# Patient Record
Sex: FEMALE | Race: ASIAN | NOT HISPANIC OR LATINO | Employment: FULL TIME | URBAN - METROPOLITAN AREA
[De-identification: names, ages, dates, MRNs, and addresses within clinical notes are randomized per-mention and may not be internally consistent; named-entity substitution may affect disease eponyms.]

---

## 2017-07-20 ENCOUNTER — OFFICE VISIT (OUTPATIENT)
Dept: INTERNAL MEDICINE | Facility: CLINIC | Age: 22
End: 2017-07-20

## 2017-07-20 VITALS
WEIGHT: 199 LBS | HEART RATE: 72 BPM | HEIGHT: 71 IN | BODY MASS INDEX: 27.86 KG/M2 | SYSTOLIC BLOOD PRESSURE: 120 MMHG | DIASTOLIC BLOOD PRESSURE: 68 MMHG

## 2017-07-20 DIAGNOSIS — M54.9 CHRONIC BILATERAL BACK PAIN, UNSPECIFIED BACK LOCATION: Primary | ICD-10-CM

## 2017-07-20 DIAGNOSIS — G89.29 CHRONIC BILATERAL BACK PAIN, UNSPECIFIED BACK LOCATION: Primary | ICD-10-CM

## 2017-07-20 PROCEDURE — 99213 OFFICE O/P EST LOW 20 MIN: CPT | Performed by: INTERNAL MEDICINE

## 2017-07-20 NOTE — PROGRESS NOTES
Patient is a 21 y.o. female who is here for back pain.  Chief Complaint   Patient presents with   • Back Pain         HPI:  Patient c/o increased back pain.  Thinks it's related to her large breast.  Unable to exercise due to the back pain.  Not tried any prescription rx.  Has also seen chiropractor.  Has not tried PT yet.     History:    Patient Active Problem List   Diagnosis   • Routine general medical examination at a health care facility   • Chronic bilateral back pain       No past medical history on file.    Past Surgical History:   Procedure Laterality Date   • TONSILLECTOMY AND ADENOIDECTOMY         Current Outpatient Prescriptions on File Prior to Visit   Medication Sig   • aluminum chloride (DRYSOL) 20 % external solution Apply  topically Every Night.     No current facility-administered medications on file prior to visit.        Family History   Problem Relation Age of Onset   • Diabetes Other    • Hypertension Other        Social History     Social History   • Marital status: Single     Spouse name: N/A   • Number of children: N/A   • Years of education: N/A     Occupational History   • Not on file.     Social History Main Topics   • Smoking status: Never Smoker   • Smokeless tobacco: Not on file   • Alcohol use Not on file   • Drug use: Not on file   • Sexual activity: Not on file     Other Topics Concern   • Not on file     Social History Narrative         ROS:    Review of Systems   Constitutional: Negative for chills, fatigue, fever and unexpected weight change.   HENT: Negative for congestion, ear pain, hearing loss, rhinorrhea, sinus pressure, sore throat and trouble swallowing.    Eyes: Negative for discharge and itching.   Respiratory: Negative for cough, chest tightness and shortness of breath.    Cardiovascular: Negative for chest pain, palpitations and leg swelling.   Gastrointestinal: Negative for abdominal pain, blood in stool, constipation, diarrhea and vomiting.   Endocrine: Negative for  "polydipsia and polyuria.   Genitourinary: Negative for difficulty urinating, dysuria, enuresis, frequency, hematuria and urgency.        GYN with Dr Dial   Musculoskeletal: Positive for back pain. Negative for arthralgias, gait problem and joint swelling.   Skin: Negative for rash and wound.   Allergic/Immunologic: Negative for immunocompromised state.   Neurological: Negative for dizziness, syncope, weakness, light-headedness, numbness and headaches.   Hematological: Does not bruise/bleed easily.   Psychiatric/Behavioral: Negative for behavioral problems, dysphoric mood and sleep disturbance. The patient is not nervous/anxious.        /68  Pulse 72  Ht 71\" (180.3 cm)  Wt 199 lb (90.3 kg)  BMI 27.75 kg/m2    Physical Exam:    Physical Exam   Constitutional: She is oriented to person, place, and time. She appears well-developed and well-nourished.   HENT:   Head: Normocephalic and atraumatic.   Right Ear: External ear normal.   Left Ear: External ear normal.   Mouth/Throat: Oropharynx is clear and moist.   Eyes: Conjunctivae and EOM are normal. Pupils are equal, round, and reactive to light.   Neck: Normal range of motion. Neck supple.   Cardiovascular: Normal rate, regular rhythm, normal heart sounds and intact distal pulses.    Pulmonary/Chest: Effort normal and breath sounds normal.   Abdominal: Soft. Bowel sounds are normal.   Musculoskeletal: Normal range of motion. She exhibits deformity (mild kyphosis).   Neurological: She is alert and oriented to person, place, and time. She has normal reflexes.   Skin: Skin is warm and dry.   Psychiatric: She has a normal mood and affect. Her behavior is normal. Judgment and thought content normal.   Vitals reviewed.      Procedure:      Discussion/Summary:    Chronic back pain sec to large breast-  Will give trial of vimovo, and refer to plastics      Current Outpatient Prescriptions:   •  aluminum chloride (DRYSOL) 20 % external solution, Apply  topically " Every Night., Disp: 60 mL, Rfl: 2  •  SPRINTEC 28 0.25-35 MG-MCG per tablet, , Disp: , Rfl:   •  Naproxen-Esomeprazole (VIMOVO) 500-20 MG tablet delayed-release, Take 1 tablet by mouth 2 (Two) Times a Day As Needed (back pain)., Disp: 36 tablet, Rfl: 0        Ida was seen today for back pain.    Diagnoses and all orders for this visit:    Chronic bilateral back pain, unspecified back location  -     Naproxen-Esomeprazole (VIMOVO) 500-20 MG tablet delayed-release; Take 1 tablet by mouth 2 (Two) Times a Day As Needed (back pain).

## 2018-07-25 ENCOUNTER — OFFICE VISIT (OUTPATIENT)
Dept: INTERNAL MEDICINE | Facility: CLINIC | Age: 23
End: 2018-07-25

## 2018-07-25 VITALS
DIASTOLIC BLOOD PRESSURE: 80 MMHG | WEIGHT: 198.4 LBS | BODY MASS INDEX: 27.67 KG/M2 | SYSTOLIC BLOOD PRESSURE: 110 MMHG | OXYGEN SATURATION: 100 % | HEART RATE: 84 BPM

## 2018-07-25 DIAGNOSIS — Z00.00 HEALTHCARE MAINTENANCE: Primary | ICD-10-CM

## 2018-07-25 PROCEDURE — 99395 PREV VISIT EST AGE 18-39: CPT | Performed by: INTERNAL MEDICINE

## 2018-07-25 RX ORDER — ETONOGESTREL/ETHINYL ESTRADIOL .12-.015MG
RING, VAGINAL VAGINAL
COMMUNITY
Start: 2018-06-25 | End: 2019-07-29

## 2018-07-25 NOTE — PROGRESS NOTES
Chief Complaint   Patient presents with   • Annual Exam     Is considering breast reduction secondary to the amount of upper back pain it has been causing. Exercise and still trouble losing weight.    Reported Health  Good Yes  FairNo  PoorNo      Dental,Vision,Hearing  Regular dental visitsYes  Vision ProblemsYes  Hearing LossNo      Immunization Status:  Up To DateYes        Lifestyle  Healthy DietYes  Weight ConcernsNo  Regular ExerciseYes  Tobacco UseNo  Alcohol UseNo  Drug AbuseNo      Screening  Cancer ScreeningYes  Metabolic ScreeningYes  Risk ScreeningYes      Review of Systems   Constitutional: Negative for activity change, appetite change, chills, diaphoresis, fatigue, fever and unexpected weight change.   HENT: Negative for congestion, ear discharge, ear pain, mouth sores, nosebleeds, sinus pressure, sneezing and sore throat.    Eyes: Negative for pain, discharge and itching.   Respiratory: Negative for cough, chest tightness, shortness of breath and wheezing.    Cardiovascular: Negative for chest pain, palpitations and leg swelling.   Gastrointestinal: Negative for abdominal pain, constipation, diarrhea, nausea and vomiting.   Endocrine: Negative for cold intolerance, heat intolerance, polydipsia and polyphagia.   Genitourinary: Negative for dysuria, flank pain, frequency, hematuria and urgency.   Musculoskeletal: Positive for back pain (upper mainly). Negative for arthralgias, gait problem, myalgias, neck pain and neck stiffness.   Skin: Negative for color change, pallor and rash.   Neurological: Negative for seizures, speech difficulty, numbness and headaches.   Psychiatric/Behavioral: Negative for agitation, confusion, decreased concentration and sleep disturbance. The patient is not nervous/anxious.      /80   Pulse 84   Wt 90 kg (198 lb 6.4 oz)   SpO2 100%   BMI 27.67 kg/m²     Physical Exam   Constitutional: She is oriented to person, place, and time. She appears well-developed.   HENT:    Head: Normocephalic.   Right Ear: External ear normal.   Left Ear: External ear normal.   Nose: Nose normal.   Mouth/Throat: Oropharynx is clear and moist.   Eyes: Pupils are equal, round, and reactive to light. Conjunctivae are normal.   Neck: No JVD present. No thyromegaly present.   Cardiovascular: Normal rate, regular rhythm and normal heart sounds.  Exam reveals no friction rub.    No murmur heard.  Pulmonary/Chest: Effort normal and breath sounds normal. No respiratory distress. She has no wheezes. She has no rales.   Abdominal: Soft. Bowel sounds are normal. She exhibits no distension. There is no tenderness. There is no guarding.   Musculoskeletal: She exhibits no edema or tenderness.   Lymphadenopathy:     She has no cervical adenopathy.   Neurological: She is oriented to person, place, and time. She displays normal reflexes. No cranial nerve deficit.   Skin: No rash noted.   Psychiatric: Her behavior is normal.   Nursing note and vitals reviewed.                Diet and Exercise    Healthy Diet Yes  Adequate DietYes  Poor DietNo  Adequate Exercise RegimenYes  Inadequate Exercise RegimenNo      Cervical Cancer Screening    Risks and benefits discussedYes  Screening currentYes  PAP Done Today OrderedNo  Screening Not IndicatedNo  Pap Every 3 yearsYes  Screening Done By GYNNo    Breast Cancer screening  Not indicated      STD Testing  ChlamydiaNo  GonorrheaNo  HIVNo      Osteoporosis Screening  Not indicated    Colorectal Cancer Screening  Screen at 50 or sooner if indicated  Metabolic Screening  GlucoseYes  LipidsYes  CBCYes  TSHYes  UAYes  CMPYes  25OHNo      Immunizations  Risks and benefits discussedYes  Immunizations Up To Dateyes  Immunizations NeededNo  Immunizations Per OrdersNo  Patient DNoeclines      Preventative Counseling  NutritionYes  Aerobic ExerciseYes  Weight Bearing ExerciseYes  Weight LossYes  Calcium SupplementsYes  Vitamin D SupplementsYes  Reproductive HealthNo  Cardiovascular Risk  ReductionNo  Tobacco CessationNo  Alcohol UseNo  Sunscreen UseYes  Self Skin ExaminationNo  Helmet UseNo  Seat Belt UseYes  Fall Risk ReductionNo  Advanced Directive PlanningNo      Patient Discussion  PatientYes  FamilyNo  Counselingyes  Ida was seen today for annual exam.    Diagnoses and all orders for this visit:    Healthcare maintenance  -     Comprehensive Metabolic Panel; Future  -     Lipid Panel; Future  -     CBC & Differential; Future  -     TSH; Future

## 2018-07-26 ENCOUNTER — LAB (OUTPATIENT)
Dept: INTERNAL MEDICINE | Facility: CLINIC | Age: 23
End: 2018-07-26

## 2018-07-26 DIAGNOSIS — Z00.00 HEALTHCARE MAINTENANCE: ICD-10-CM

## 2018-07-26 LAB
ALBUMIN SERPL-MCNC: 4.38 G/DL (ref 3.2–4.8)
ALBUMIN/GLOB SERPL: 1.6 G/DL (ref 1.5–2.5)
ALP SERPL-CCNC: 65 U/L (ref 25–100)
ALT SERPL W P-5'-P-CCNC: 21 U/L (ref 7–40)
ANION GAP SERPL CALCULATED.3IONS-SCNC: 9 MMOL/L (ref 3–11)
ARTICHOKE IGE QN: 118 MG/DL (ref 0–130)
AST SERPL-CCNC: 24 U/L (ref 0–33)
BASOPHILS # BLD AUTO: 0.03 10*3/MM3 (ref 0–0.2)
BASOPHILS NFR BLD AUTO: 0.4 % (ref 0–1)
BILIRUB SERPL-MCNC: 0.5 MG/DL (ref 0.3–1.2)
BUN BLD-MCNC: 11 MG/DL (ref 9–23)
BUN/CREAT SERPL: 14.1 (ref 7–25)
CALCIUM SPEC-SCNC: 9.7 MG/DL (ref 8.7–10.4)
CHLORIDE SERPL-SCNC: 109 MMOL/L (ref 99–109)
CHOLEST SERPL-MCNC: 201 MG/DL (ref 0–200)
CO2 SERPL-SCNC: 25 MMOL/L (ref 20–31)
CREAT BLD-MCNC: 0.78 MG/DL (ref 0.6–1.3)
DEPRECATED RDW RBC AUTO: 45.3 FL (ref 37–54)
EOSINOPHIL # BLD AUTO: 0.36 10*3/MM3 (ref 0–0.3)
EOSINOPHIL NFR BLD AUTO: 4.9 % (ref 0–3)
ERYTHROCYTE [DISTWIDTH] IN BLOOD BY AUTOMATED COUNT: 14.3 % (ref 11.3–14.5)
GFR SERPL CREATININE-BSD FRML MDRD: 111 ML/MIN/1.73
GFR SERPL CREATININE-BSD FRML MDRD: 92 ML/MIN/1.73
GLOBULIN UR ELPH-MCNC: 2.8 GM/DL
GLUCOSE BLD-MCNC: 82 MG/DL (ref 70–100)
HCT VFR BLD AUTO: 43.8 % (ref 34.5–44)
HDLC SERPL-MCNC: 81 MG/DL (ref 40–60)
HGB BLD-MCNC: 14.6 G/DL (ref 11.5–15.5)
IMM GRANULOCYTES # BLD: 0.02 10*3/MM3 (ref 0–0.03)
IMM GRANULOCYTES NFR BLD: 0.3 % (ref 0–0.6)
LYMPHOCYTES # BLD AUTO: 3.07 10*3/MM3 (ref 0.6–4.8)
LYMPHOCYTES NFR BLD AUTO: 42.2 % (ref 24–44)
MCH RBC QN AUTO: 29.1 PG (ref 27–31)
MCHC RBC AUTO-ENTMCNC: 33.3 G/DL (ref 32–36)
MCV RBC AUTO: 87.3 FL (ref 80–99)
MONOCYTES # BLD AUTO: 0.48 10*3/MM3 (ref 0–1)
MONOCYTES NFR BLD AUTO: 6.6 % (ref 0–12)
NEUTROPHILS # BLD AUTO: 3.32 10*3/MM3 (ref 1.5–8.3)
NEUTROPHILS NFR BLD AUTO: 45.6 % (ref 41–71)
PLATELET # BLD AUTO: 231 10*3/MM3 (ref 150–450)
PMV BLD AUTO: 11.1 FL (ref 6–12)
POTASSIUM BLD-SCNC: 5.9 MMOL/L (ref 3.5–5.5)
PROT SERPL-MCNC: 7.2 G/DL (ref 5.7–8.2)
RBC # BLD AUTO: 5.02 10*6/MM3 (ref 3.89–5.14)
SODIUM BLD-SCNC: 143 MMOL/L (ref 132–146)
TRIGL SERPL-MCNC: 137 MG/DL (ref 0–150)
TSH SERPL DL<=0.05 MIU/L-ACNC: 2.25 MIU/ML (ref 0.35–5.35)
WBC NRBC COR # BLD: 7.28 10*3/MM3 (ref 3.5–10.8)

## 2018-07-26 PROCEDURE — 80053 COMPREHEN METABOLIC PANEL: CPT | Performed by: INTERNAL MEDICINE

## 2018-07-26 PROCEDURE — 80061 LIPID PANEL: CPT | Performed by: INTERNAL MEDICINE

## 2018-07-26 PROCEDURE — 84443 ASSAY THYROID STIM HORMONE: CPT | Performed by: INTERNAL MEDICINE

## 2018-07-26 PROCEDURE — 85025 COMPLETE CBC W/AUTO DIFF WBC: CPT | Performed by: INTERNAL MEDICINE

## 2018-08-01 ENCOUNTER — TELEPHONE (OUTPATIENT)
Dept: INTERNAL MEDICINE | Facility: CLINIC | Age: 23
End: 2018-08-01

## 2018-08-01 DIAGNOSIS — E87.5 HYPERKALEMIA: Primary | ICD-10-CM

## 2018-08-02 ENCOUNTER — OFFICE VISIT (OUTPATIENT)
Dept: INTERNAL MEDICINE | Facility: CLINIC | Age: 23
End: 2018-08-02

## 2018-08-02 VITALS
HEART RATE: 88 BPM | WEIGHT: 195.4 LBS | SYSTOLIC BLOOD PRESSURE: 100 MMHG | DIASTOLIC BLOOD PRESSURE: 80 MMHG | OXYGEN SATURATION: 98 % | BODY MASS INDEX: 27.25 KG/M2

## 2018-08-02 DIAGNOSIS — E87.5 HYPERKALEMIA: ICD-10-CM

## 2018-08-02 DIAGNOSIS — Z87.898 HISTORY OF BREAST LUMP: Primary | ICD-10-CM

## 2018-08-02 DIAGNOSIS — N64.4 BREAST PAIN, RIGHT: ICD-10-CM

## 2018-08-02 LAB
ANION GAP SERPL CALCULATED.3IONS-SCNC: 11 MMOL/L (ref 3–11)
BUN BLD-MCNC: 14 MG/DL (ref 9–23)
BUN/CREAT SERPL: 16.7 (ref 7–25)
CALCIUM SPEC-SCNC: 9.6 MG/DL (ref 8.7–10.4)
CHLORIDE SERPL-SCNC: 106 MMOL/L (ref 99–109)
CO2 SERPL-SCNC: 24 MMOL/L (ref 20–31)
CREAT BLD-MCNC: 0.84 MG/DL (ref 0.6–1.3)
GFR SERPL CREATININE-BSD FRML MDRD: 102 ML/MIN/1.73
GFR SERPL CREATININE-BSD FRML MDRD: 84 ML/MIN/1.73
GLUCOSE BLD-MCNC: 85 MG/DL (ref 70–100)
POTASSIUM BLD-SCNC: 4.4 MMOL/L (ref 3.5–5.5)
SODIUM BLD-SCNC: 141 MMOL/L (ref 132–146)

## 2018-08-02 PROCEDURE — 99213 OFFICE O/P EST LOW 20 MIN: CPT | Performed by: INTERNAL MEDICINE

## 2018-08-02 PROCEDURE — 80048 BASIC METABOLIC PNL TOTAL CA: CPT | Performed by: INTERNAL MEDICINE

## 2018-08-02 NOTE — PROGRESS NOTES
Subjective   Ida Vasquez is a 22 y.o. female.   Chief Complaint   Patient presents with   • Breast Mass     acute       History of Present Illness   Right breast lump that was found one and half months ago. Per pt gyn found it .  She was told by gyn to go to  for evaluation for this but did not go .   The following portions of the patient's history were reviewed and updated as appropriate: allergies, current medications, past family history, past medical history, past social history, past surgical history and problem list.  pnh    Review of Systems   Constitutional: Negative for activity change, appetite change, chills, diaphoresis, fatigue, fever and unexpected weight change.   HENT: Negative for congestion, ear discharge, ear pain, mouth sores, nosebleeds, sinus pressure, sneezing and sore throat.    Eyes: Negative for pain, discharge and itching.   Respiratory: Negative for cough, chest tightness, shortness of breath and wheezing.    Cardiovascular: Negative for chest pain, palpitations and leg swelling.   Gastrointestinal: Negative for abdominal pain, constipation, diarrhea, nausea and vomiting.   Endocrine: Negative for cold intolerance, heat intolerance, polydipsia and polyphagia.   Genitourinary: Negative for dysuria, flank pain, frequency, hematuria and urgency.   Musculoskeletal: Negative for arthralgias, back pain, gait problem, myalgias, neck pain and neck stiffness.   Skin: Negative for color change, pallor and rash.   Neurological: Negative for seizures, speech difficulty, numbness and headaches.   Psychiatric/Behavioral: Negative for agitation, confusion, decreased concentration and sleep disturbance. The patient is not nervous/anxious.      /80   Pulse 88   Wt 88.6 kg (195 lb 6.4 oz)   SpO2 98%   BMI 27.25 kg/m²     Objective   Physical Exam   Constitutional: She appears well-developed.   HENT:   Head: Normocephalic.   Right Ear: External ear normal.   Left Ear: External ear normal.    Nose: Nose normal.   Mouth/Throat: Oropharynx is clear and moist.   Eyes: Pupils are equal, round, and reactive to light. Conjunctivae are normal.   Neck: No JVD present. No thyromegaly present.   Cardiovascular: Normal rate, regular rhythm and normal heart sounds.  Exam reveals no friction rub.    No murmur heard.  Pulmonary/Chest: Effort normal and breath sounds normal. No respiratory distress. She has no wheezes. She has no rales.   Abdominal: Soft. Bowel sounds are normal. She exhibits no distension. There is no tenderness. There is no guarding.   Genitourinary:   Genitourinary Comments: Right breast no mass. Left mass. No LN.    Musculoskeletal: She exhibits no edema or tenderness.   Lymphadenopathy:     She has no cervical adenopathy.   Neurological: She displays normal reflexes. No cranial nerve deficit.   Skin: No rash noted.   Psychiatric: Her behavior is normal.   Nursing note and vitals reviewed.      Assessment/Plan   Ida was seen today for breast mass.    Diagnoses and all orders for this visit:    History of breast lump  -     Mammo diagnostic digital tomosynthesis right w CAD; Future    Hyperkalemia  -     Basic metabolic panel    Breast pain, right  -     Mammo diagnostic digital tomosynthesis right w CAD; Future

## 2018-08-03 DIAGNOSIS — N64.4 BREAST PAIN, RIGHT: Primary | ICD-10-CM

## 2018-08-03 DIAGNOSIS — Z87.898 HISTORY OF BREAST LUMP: ICD-10-CM

## 2019-07-29 ENCOUNTER — OFFICE VISIT (OUTPATIENT)
Dept: INTERNAL MEDICINE | Facility: CLINIC | Age: 24
End: 2019-07-29

## 2019-07-29 VITALS
HEART RATE: 91 BPM | OXYGEN SATURATION: 98 % | WEIGHT: 210.4 LBS | SYSTOLIC BLOOD PRESSURE: 100 MMHG | BODY MASS INDEX: 29.46 KG/M2 | DIASTOLIC BLOOD PRESSURE: 70 MMHG | HEIGHT: 71 IN

## 2019-07-29 DIAGNOSIS — Z00.00 HEALTHCARE MAINTENANCE: Primary | ICD-10-CM

## 2019-07-29 LAB
ALBUMIN SERPL-MCNC: 4.4 G/DL (ref 3.5–5.2)
ALBUMIN/GLOB SERPL: 1.3 G/DL
ALP SERPL-CCNC: 64 U/L (ref 39–117)
ALT SERPL W P-5'-P-CCNC: 24 U/L (ref 1–33)
ANION GAP SERPL CALCULATED.3IONS-SCNC: 10.6 MMOL/L (ref 5–15)
AST SERPL-CCNC: 28 U/L (ref 1–32)
BASOPHILS # BLD AUTO: 0.06 10*3/MM3 (ref 0–0.2)
BASOPHILS NFR BLD AUTO: 0.8 % (ref 0–1.5)
BILIRUB BLD-MCNC: ABNORMAL MG/DL
BILIRUB SERPL-MCNC: 0.4 MG/DL (ref 0.2–1.2)
BUN BLD-MCNC: 12 MG/DL (ref 6–20)
BUN/CREAT SERPL: 13.3 (ref 7–25)
CALCIUM SPEC-SCNC: 9.9 MG/DL (ref 8.6–10.5)
CHLORIDE SERPL-SCNC: 105 MMOL/L (ref 98–107)
CHOLEST SERPL-MCNC: 202 MG/DL (ref 0–200)
CLARITY, POC: CLEAR
CO2 SERPL-SCNC: 25.4 MMOL/L (ref 22–29)
COLOR UR: YELLOW
CREAT BLD-MCNC: 0.9 MG/DL (ref 0.57–1)
DEPRECATED RDW RBC AUTO: 48.8 FL (ref 37–54)
EOSINOPHIL # BLD AUTO: 0.37 10*3/MM3 (ref 0–0.4)
EOSINOPHIL NFR BLD AUTO: 4.8 % (ref 0.3–6.2)
ERYTHROCYTE [DISTWIDTH] IN BLOOD BY AUTOMATED COUNT: 14.7 % (ref 12.3–15.4)
GFR SERPL CREATININE-BSD FRML MDRD: 77 ML/MIN/1.73
GFR SERPL CREATININE-BSD FRML MDRD: 93 ML/MIN/1.73
GLOBULIN UR ELPH-MCNC: 3.3 GM/DL
GLUCOSE BLD-MCNC: 97 MG/DL (ref 65–99)
GLUCOSE UR STRIP-MCNC: NEGATIVE MG/DL
HCT VFR BLD AUTO: 44.2 % (ref 34–46.6)
HDLC SERPL-MCNC: 76 MG/DL (ref 40–60)
HGB BLD-MCNC: 13.9 G/DL (ref 12–15.9)
IMM GRANULOCYTES # BLD AUTO: 0.02 10*3/MM3 (ref 0–0.05)
IMM GRANULOCYTES NFR BLD AUTO: 0.3 % (ref 0–0.5)
KETONES UR QL: ABNORMAL
LDLC SERPL CALC-MCNC: 113 MG/DL (ref 0–100)
LDLC/HDLC SERPL: 1.48 {RATIO}
LEUKOCYTE EST, POC: NEGATIVE
LYMPHOCYTES # BLD AUTO: 2.08 10*3/MM3 (ref 0.7–3.1)
LYMPHOCYTES NFR BLD AUTO: 27.2 % (ref 19.6–45.3)
MCH RBC QN AUTO: 28.3 PG (ref 26.6–33)
MCHC RBC AUTO-ENTMCNC: 31.4 G/DL (ref 31.5–35.7)
MCV RBC AUTO: 89.8 FL (ref 79–97)
MONOCYTES # BLD AUTO: 0.61 10*3/MM3 (ref 0.1–0.9)
MONOCYTES NFR BLD AUTO: 8 % (ref 5–12)
NEUTROPHILS # BLD AUTO: 4.52 10*3/MM3 (ref 1.7–7)
NEUTROPHILS NFR BLD AUTO: 58.9 % (ref 42.7–76)
NITRITE UR-MCNC: NEGATIVE MG/ML
NRBC BLD AUTO-RTO: 0 /100 WBC (ref 0–0.2)
PH UR: 5 [PH] (ref 5–8)
PLATELET # BLD AUTO: 261 10*3/MM3 (ref 140–450)
PMV BLD AUTO: 12 FL (ref 6–12)
POTASSIUM BLD-SCNC: 4.4 MMOL/L (ref 3.5–5.2)
PROT SERPL-MCNC: 7.7 G/DL (ref 6–8.5)
PROT UR STRIP-MCNC: NEGATIVE MG/DL
RBC # BLD AUTO: 4.92 10*6/MM3 (ref 3.77–5.28)
RBC # UR STRIP: NEGATIVE /UL
SODIUM BLD-SCNC: 141 MMOL/L (ref 136–145)
SP GR UR: 1.02 (ref 1–1.03)
TRIGL SERPL-MCNC: 66 MG/DL (ref 0–150)
TSH SERPL DL<=0.05 MIU/L-ACNC: 1.61 MIU/ML (ref 0.27–4.2)
UROBILINOGEN UR QL: ABNORMAL
VLDLC SERPL-MCNC: 13.2 MG/DL (ref 5–40)
WBC NRBC COR # BLD: 7.66 10*3/MM3 (ref 3.4–10.8)

## 2019-07-29 PROCEDURE — 99395 PREV VISIT EST AGE 18-39: CPT | Performed by: INTERNAL MEDICINE

## 2019-07-29 PROCEDURE — 81003 URINALYSIS AUTO W/O SCOPE: CPT | Performed by: INTERNAL MEDICINE

## 2019-07-29 PROCEDURE — 80053 COMPREHEN METABOLIC PANEL: CPT | Performed by: INTERNAL MEDICINE

## 2019-07-29 PROCEDURE — 80061 LIPID PANEL: CPT | Performed by: INTERNAL MEDICINE

## 2019-07-29 PROCEDURE — 84443 ASSAY THYROID STIM HORMONE: CPT | Performed by: INTERNAL MEDICINE

## 2019-07-29 PROCEDURE — 85025 COMPLETE CBC W/AUTO DIFF WBC: CPT | Performed by: INTERNAL MEDICINE

## 2019-07-29 NOTE — PROGRESS NOTES
Chief Complaint   Patient presents with   • Annual Exam       Reported Health  Good Yes  FairNo  PoorNo      Dental,Vision,Hearing  Regular dental visitsYes  Vision ProblemsNo  Hearing LossNo      Immunization Status:  Up To DateNo        Lifestyle  Healthy DietYes  Weight ConcernsNo  Regular ExerciseYes  Tobacco UseNo  Alcohol UseYes  Drug AbuseNo      Screening  Cancer ScreeningYes  Metabolic ScreeningYes  Risk ScreeningYes  Past Medical History:   Diagnosis Date   • Allergic    • EBV infection      Past Surgical History:   Procedure Laterality Date   • TONSILLECTOMY     • TONSILLECTOMY AND ADENOIDECTOMY       Family History   Problem Relation Age of Onset   • Diabetes Other    • Hypertension Other    • Heart disease Father    • Diabetes Father    • Leukemia Father        Social History     Socioeconomic History   • Marital status: Single     Spouse name: Not on file   • Number of children: Not on file   • Years of education: Not on file   • Highest education level: Not on file   Tobacco Use   • Smoking status: Never Smoker   • Smokeless tobacco: Never Used   Substance and Sexual Activity   • Alcohol use: No   • Drug use: No       Review of Systems   Constitutional: Negative for activity change, appetite change, chills, diaphoresis, fatigue, fever and unexpected weight change.   HENT: Negative for congestion, ear discharge, ear pain, mouth sores, nosebleeds, sinus pressure, sneezing and sore throat.    Eyes: Negative for pain, discharge and itching.   Respiratory: Negative for cough, chest tightness, shortness of breath and wheezing.    Cardiovascular: Negative for chest pain, palpitations and leg swelling.   Gastrointestinal: Negative for abdominal pain, constipation, diarrhea, nausea and vomiting.   Endocrine: Negative for cold intolerance, heat intolerance, polydipsia and polyphagia.   Genitourinary: Negative for dysuria, flank pain, frequency, hematuria and urgency.   Musculoskeletal: Negative for  "arthralgias, back pain, gait problem, myalgias, neck pain and neck stiffness.   Skin: Negative for color change, pallor and rash.   Neurological: Negative for seizures, speech difficulty, numbness and headaches.   Psychiatric/Behavioral: Negative for agitation, confusion, decreased concentration and sleep disturbance. The patient is not nervous/anxious.    /70   Pulse 91   Ht 179.1 cm (70.5\")   Wt 95.4 kg (210 lb 6.4 oz)   SpO2 98%   BMI 29.76 kg/m²       Physical Exam   Constitutional: She is oriented to person, place, and time. She appears well-developed.   HENT:   Head: Normocephalic.   Right Ear: External ear normal.   Left Ear: External ear normal.   Nose: Nose normal.   Mouth/Throat: Oropharynx is clear and moist.   Eyes: Conjunctivae are normal. Pupils are equal, round, and reactive to light.   Neck: No JVD present. No thyromegaly present.   Cardiovascular: Normal rate, regular rhythm and normal heart sounds. Exam reveals no friction rub.   No murmur heard.  Pulmonary/Chest: Effort normal and breath sounds normal. No respiratory distress. She has no wheezes. She has no rales.   Abdominal: Soft. Bowel sounds are normal. She exhibits no distension. There is no tenderness. There is no guarding.   Musculoskeletal: She exhibits no edema or tenderness.   Lymphadenopathy:     She has no cervical adenopathy.   Neurological: She is alert and oriented to person, place, and time. She displays normal reflexes. No cranial nerve deficit.   Skin: No rash noted.   Psychiatric: Her behavior is normal.   Nursing note and vitals reviewed.                Diet and Exercise    Healthy Diet Yes  Adequate DietYes  Poor DietNo  Adequate Exercise RegimenYes  Inadequate Exercise RegimenNo      Cervical Cancer Screening    Risks and benefits discussedYes  Screening currentYes  PAP Done Today OrderedNo  Screening Not IndicatedNo  Pap Every 3 yearsYes  Screening Done By GYNNo    Breast Cancer screening  Not indicated      STD " Testing  ChlamydiaNo  GonorrheaNo  HIVNo      Osteoporosis Screening  Not indicated    Colorectal Cancer Screening  Not indicated    Metabolic Screening  GlucoseYes  LipidsYes  CBCYes  TSHYes  UAYes  CMPYes  25OHNo      Immunizations  Risks and benefits discussedYes  Immunizations Up To DateYes  Immunizations NeededNo  Immunizations Per OrdersNo  Patient DNoeclines      Preventative Counseling  NutritionYes  Aerobic ExerciseYes  Weight Bearing ExerciseYes  Weight LossNo  Calcium SupplementsYes  Vitamin D SupplementsYes  Reproductive HealthYes  Cardiovascular Risk ReductionNo  Tobacco CessationNo  Alcohol UseYes  Sunscreen UseYes  Self Skin ExaminationYes  Helmet UseYes  Seat Belt UseYes  Fall Risk ReductionNo  Advanced Directive PlanningNo      Patient Discussion  PatientYes  FamilyNo  CounselingYes  Ida was seen today for annual exam.    Diagnoses and all orders for this visit:    Healthcare maintenance  -     CBC & Differential  -     Comprehensive Metabolic Panel  -     Lipid Panel  -     TSH

## 2020-07-31 ENCOUNTER — OFFICE VISIT (OUTPATIENT)
Dept: INTERNAL MEDICINE | Facility: CLINIC | Age: 25
End: 2020-07-31

## 2020-07-31 ENCOUNTER — LAB (OUTPATIENT)
Dept: LAB | Facility: HOSPITAL | Age: 25
End: 2020-07-31

## 2020-07-31 VITALS
TEMPERATURE: 97.4 F | OXYGEN SATURATION: 99 % | SYSTOLIC BLOOD PRESSURE: 106 MMHG | WEIGHT: 211.6 LBS | HEIGHT: 71 IN | BODY MASS INDEX: 29.62 KG/M2 | HEART RATE: 77 BPM | DIASTOLIC BLOOD PRESSURE: 76 MMHG

## 2020-07-31 DIAGNOSIS — R11.10 VOMITING IN ADULT: ICD-10-CM

## 2020-07-31 DIAGNOSIS — Z23 NEED FOR VACCINATION: ICD-10-CM

## 2020-07-31 DIAGNOSIS — Z00.00 HEALTHCARE MAINTENANCE: ICD-10-CM

## 2020-07-31 DIAGNOSIS — Z00.00 HEALTHCARE MAINTENANCE: Primary | ICD-10-CM

## 2020-07-31 LAB
ALBUMIN SERPL-MCNC: 4.4 G/DL (ref 3.5–5.2)
ALBUMIN/GLOB SERPL: 1.6 G/DL
ALP SERPL-CCNC: 63 U/L (ref 39–117)
ALT SERPL W P-5'-P-CCNC: 19 U/L (ref 1–33)
ANION GAP SERPL CALCULATED.3IONS-SCNC: 11 MMOL/L (ref 5–15)
AST SERPL-CCNC: 24 U/L (ref 1–32)
BASOPHILS # BLD AUTO: 0.05 10*3/MM3 (ref 0–0.2)
BASOPHILS NFR BLD AUTO: 0.7 % (ref 0–1.5)
BILIRUB BLD-MCNC: NEGATIVE MG/DL
BILIRUB SERPL-MCNC: 0.3 MG/DL (ref 0–1.2)
BUN SERPL-MCNC: 8 MG/DL (ref 6–20)
BUN/CREAT SERPL: 9.8 (ref 7–25)
CALCIUM SPEC-SCNC: 9.9 MG/DL (ref 8.6–10.5)
CHLORIDE SERPL-SCNC: 104 MMOL/L (ref 98–107)
CHOLEST SERPL-MCNC: 181 MG/DL (ref 0–200)
CLARITY, POC: CLEAR
CO2 SERPL-SCNC: 23 MMOL/L (ref 22–29)
COLOR UR: YELLOW
CREAT SERPL-MCNC: 0.82 MG/DL (ref 0.57–1)
DEPRECATED RDW RBC AUTO: 43.7 FL (ref 37–54)
EOSINOPHIL # BLD AUTO: 0.1 10*3/MM3 (ref 0–0.4)
EOSINOPHIL NFR BLD AUTO: 1.5 % (ref 0.3–6.2)
ERYTHROCYTE [DISTWIDTH] IN BLOOD BY AUTOMATED COUNT: 13.6 % (ref 12.3–15.4)
GFR SERPL CREATININE-BSD FRML MDRD: 103 ML/MIN/1.73
GFR SERPL CREATININE-BSD FRML MDRD: 85 ML/MIN/1.73
GLOBULIN UR ELPH-MCNC: 2.8 GM/DL
GLUCOSE SERPL-MCNC: 80 MG/DL (ref 65–99)
GLUCOSE UR STRIP-MCNC: NEGATIVE MG/DL
HCG INTACT+B SERPL-ACNC: 0.59 MIU/ML
HCT VFR BLD AUTO: 43.1 % (ref 34–46.6)
HDLC SERPL-MCNC: 66 MG/DL (ref 40–60)
HGB BLD-MCNC: 14.1 G/DL (ref 12–15.9)
IMM GRANULOCYTES # BLD AUTO: 0.01 10*3/MM3 (ref 0–0.05)
IMM GRANULOCYTES NFR BLD AUTO: 0.1 % (ref 0–0.5)
KETONES UR QL: NEGATIVE
LDLC SERPL CALC-MCNC: 96 MG/DL (ref 0–100)
LDLC/HDLC SERPL: 1.45 {RATIO}
LEUKOCYTE EST, POC: NEGATIVE
LYMPHOCYTES # BLD AUTO: 2.42 10*3/MM3 (ref 0.7–3.1)
LYMPHOCYTES NFR BLD AUTO: 35.4 % (ref 19.6–45.3)
MCH RBC QN AUTO: 28.6 PG (ref 26.6–33)
MCHC RBC AUTO-ENTMCNC: 32.7 G/DL (ref 31.5–35.7)
MCV RBC AUTO: 87.4 FL (ref 79–97)
MONOCYTES # BLD AUTO: 0.33 10*3/MM3 (ref 0.1–0.9)
MONOCYTES NFR BLD AUTO: 4.8 % (ref 5–12)
NEUTROPHILS NFR BLD AUTO: 3.92 10*3/MM3 (ref 1.7–7)
NEUTROPHILS NFR BLD AUTO: 57.5 % (ref 42.7–76)
NITRITE UR-MCNC: NEGATIVE MG/ML
NRBC BLD AUTO-RTO: 0 /100 WBC (ref 0–0.2)
PH UR: 5.5 [PH] (ref 5–8)
PLATELET # BLD AUTO: 280 10*3/MM3 (ref 140–450)
PMV BLD AUTO: 11.8 FL (ref 6–12)
POTASSIUM SERPL-SCNC: 4.2 MMOL/L (ref 3.5–5.2)
PROT SERPL-MCNC: 7.2 G/DL (ref 6–8.5)
PROT UR STRIP-MCNC: NEGATIVE MG/DL
RBC # BLD AUTO: 4.93 10*6/MM3 (ref 3.77–5.28)
RBC # UR STRIP: NEGATIVE /UL
SODIUM SERPL-SCNC: 138 MMOL/L (ref 136–145)
SP GR UR: 1.03 (ref 1–1.03)
TRIGL SERPL-MCNC: 95 MG/DL (ref 0–150)
TSH SERPL DL<=0.05 MIU/L-ACNC: 3.06 UIU/ML (ref 0.27–4.2)
UROBILINOGEN UR QL: NORMAL
VLDLC SERPL-MCNC: 19 MG/DL (ref 5–40)
WBC # BLD AUTO: 6.83 10*3/MM3 (ref 3.4–10.8)

## 2020-07-31 PROCEDURE — 84702 CHORIONIC GONADOTROPIN TEST: CPT | Performed by: INTERNAL MEDICINE

## 2020-07-31 PROCEDURE — 90715 TDAP VACCINE 7 YRS/> IM: CPT | Performed by: INTERNAL MEDICINE

## 2020-07-31 PROCEDURE — 99395 PREV VISIT EST AGE 18-39: CPT | Performed by: INTERNAL MEDICINE

## 2020-07-31 PROCEDURE — 84443 ASSAY THYROID STIM HORMONE: CPT | Performed by: INTERNAL MEDICINE

## 2020-07-31 PROCEDURE — 80053 COMPREHEN METABOLIC PANEL: CPT | Performed by: INTERNAL MEDICINE

## 2020-07-31 PROCEDURE — 80061 LIPID PANEL: CPT | Performed by: INTERNAL MEDICINE

## 2020-07-31 PROCEDURE — 85025 COMPLETE CBC W/AUTO DIFF WBC: CPT | Performed by: INTERNAL MEDICINE

## 2020-07-31 PROCEDURE — 81003 URINALYSIS AUTO W/O SCOPE: CPT | Performed by: INTERNAL MEDICINE

## 2020-07-31 PROCEDURE — 90471 IMMUNIZATION ADMIN: CPT | Performed by: INTERNAL MEDICINE

## 2020-07-31 NOTE — PROGRESS NOTES
Chief Complaint   Patient presents with   • Annual Exam   • Vomiting     On and off for a year       Reported Health  Good Yes  FairNo  PoorNo      Dental,Vision,Hearing  Regular dental visitsYes  Vision ProblemsNo  Hearing LossNo      Immunization Status:  Up To DateYes        Lifestyle  Healthy DietYes  Weight ConcernsNo  Regular ExerciseYes  Tobacco UseNo  Alcohol UseYes  Drug AbuseNo      Screening  Cancer ScreeningYes  Metabolic ScreeningYes  Risk ScreeningYes  Past Medical History:   Diagnosis Date   • Allergic    • EBV infection      Past Surgical History:   Procedure Laterality Date   • TONSILLECTOMY     • TONSILLECTOMY AND ADENOIDECTOMY       Family History   Problem Relation Age of Onset   • Diabetes Other    • Hypertension Other    • Heart disease Father    • Diabetes Father    • Leukemia Father      Social History     Socioeconomic History   • Marital status: Single     Spouse name: Not on file   • Number of children: Not on file   • Years of education: Not on file   • Highest education level: Not on file   Tobacco Use   • Smoking status: Never Smoker   • Smokeless tobacco: Never Used   Substance and Sexual Activity   • Alcohol use: No   • Drug use: No         Review of Systems   Constitutional: Negative for activity change, appetite change, chills, diaphoresis, fatigue, fever and unexpected weight change.   HENT: Negative for congestion, ear discharge, ear pain, mouth sores, nosebleeds, sinus pressure, sneezing and sore throat.    Eyes: Negative for pain, discharge and itching.   Respiratory: Negative for cough, chest tightness, shortness of breath and wheezing.    Cardiovascular: Negative for chest pain, palpitations and leg swelling.   Gastrointestinal: Positive for vomiting. Negative for abdominal pain, constipation, diarrhea and nausea. Rectal pain: few random episodes of vomiting over last 2 months. No abdominal pain. No fever.    Endocrine: Negative for cold intolerance, heat intolerance,  "polydipsia and polyphagia.   Genitourinary: Negative for dysuria, flank pain, frequency, hematuria and urgency.   Musculoskeletal: Negative for arthralgias, back pain, gait problem, myalgias, neck pain and neck stiffness.   Skin: Negative for color change, pallor and rash.   Neurological: Negative for seizures, speech difficulty, numbness and headaches.   Psychiatric/Behavioral: Negative for agitation, confusion, decreased concentration and sleep disturbance. The patient is not nervous/anxious.      Blood pressure 106/76, pulse 77, temperature 97.4 °F (36.3 °C), height 179.1 cm (70.5\"), weight 96 kg (211 lb 9.6 oz), last menstrual period 07/22/2020, SpO2 99 %.      Physical Exam   Constitutional: She is oriented to person, place, and time. She appears well-developed.   HENT:   Head: Normocephalic.   Right Ear: External ear normal.   Left Ear: External ear normal.   Nose: Nose normal.   Mouth/Throat: Oropharynx is clear and moist.   Eyes: Pupils are equal, round, and reactive to light. Conjunctivae are normal.   Neck: No JVD present. No thyromegaly present.   Cardiovascular: Normal rate, regular rhythm and normal heart sounds. Exam reveals no friction rub.   No murmur heard.  Pulmonary/Chest: Effort normal and breath sounds normal. No respiratory distress. She has no wheezes. She has no rales.   Abdominal: Soft. Bowel sounds are normal. She exhibits no distension. There is no tenderness. There is no guarding.   Musculoskeletal: She exhibits no edema or tenderness.   Lymphadenopathy:     She has no cervical adenopathy.   Neurological: She is alert and oriented to person, place, and time. She displays normal reflexes. No cranial nerve deficit.   Skin: No rash noted.   Psychiatric: Her behavior is normal.   Nursing note and vitals reviewed.                Diet and Exercise    Healthy Diet Yes  Adequate DietYes  Poor DietNo  Adequate Exercise RegimenYes  Inadequate Exercise RegimenNo      Cervical Cancer " Screening    Risks and benefits discussedYes  Screening currentYes  PAP Done Today OrderedNo  Screening Not IndicatedNo  Pap Every 3 yearsYes  Screening Done By GYNYes    Breast Cancer screening  Not indicated      STD Testing  ChlamydiaNo  GonorrheaNo  HIVNo      Osteoporosis Screening  Not indicated    Colorectal Cancer Screening  Not indicated    Metabolic Screening  GlucoseYes  LipidsYes  CBCYes  TSHYes  UAYes  CMPYes  25OHNo      Immunizations  Risks and benefits discussedYes  Immunizations Up To DateYes  Immunizations NeededNo  Immunizations Per OrdersYes  Patient DNoeclines      Preventative Counseling  NutritionYes  Aerobic ExerciseYes  Weight Bearing ExerciseNo  Weight LossNo  Calcium SupplementsYes  Vitamin D SupplementsYes  Reproductive HealthYes  Cardiovascular Risk ReductionNo  Tobacco CessationNo  Alcohol UseYes  Sunscreen UseYes  Self Skin ExaminationYes  Helmet UseNo  Seat Belt UseYes  Fall Risk ReductionNo  Advanced Directive PlanningNo      Patient Discussion  PatientYes  FamilyNo  CounselingYes  Ida was seen today for annual exam and vomiting.    Diagnoses and all orders for this visit:    Healthcare maintenance  -     CBC & Differential; Future  -     Comprehensive Metabolic Panel; Future  -     Lipid Panel; Future  -     TSH; Future  -     POCT urinalysis dipstick, automated    Need for vaccination  -     Tdap Vaccine Greater Than or Equal To 8yo IM    Vomiting in adult  -     hCG, Quantitative, Pregnancy; Future

## 2021-07-25 ENCOUNTER — HOSPITAL ENCOUNTER (INPATIENT)
Age: 26
LOS: 1 days | Discharge: HOME | DRG: 505 | End: 2021-07-27
Attending: EMERGENCY MEDICINE | Admitting: INTERNAL MEDICINE
Payer: COMMERCIAL

## 2021-07-25 ENCOUNTER — APPOINTMENT (OUTPATIENT)
Dept: GENERAL RADIOLOGY | Age: 26
DRG: 505 | End: 2021-07-25
Payer: COMMERCIAL

## 2021-07-25 DIAGNOSIS — G89.18 POST-OP PAIN: ICD-10-CM

## 2021-07-25 DIAGNOSIS — S92.901A CLOSED FRACTURE OF RIGHT FOOT, INITIAL ENCOUNTER: Primary | ICD-10-CM

## 2021-07-25 DIAGNOSIS — R55 SYNCOPE AND COLLAPSE: ICD-10-CM

## 2021-07-25 LAB
ANION GAP SERPL CALCULATED.3IONS-SCNC: 10 MMOL/L (ref 3–16)
BASOPHILS ABSOLUTE: 0 K/UL (ref 0–0.2)
BASOPHILS RELATIVE PERCENT: 0.2 %
BUN BLDV-MCNC: 12 MG/DL (ref 7–20)
CALCIUM SERPL-MCNC: 9.6 MG/DL (ref 8.3–10.6)
CHLORIDE BLD-SCNC: 104 MMOL/L (ref 99–110)
CO2: 26 MMOL/L (ref 21–32)
CREAT SERPL-MCNC: 0.9 MG/DL (ref 0.6–1.1)
EOSINOPHILS ABSOLUTE: 0.1 K/UL (ref 0–0.6)
EOSINOPHILS RELATIVE PERCENT: 0.8 %
GFR AFRICAN AMERICAN: >60
GFR NON-AFRICAN AMERICAN: >60
GLUCOSE BLD-MCNC: 96 MG/DL (ref 70–99)
HCT VFR BLD CALC: 40.3 % (ref 36–48)
HEMOGLOBIN: 13.9 G/DL (ref 12–16)
LYMPHOCYTES ABSOLUTE: 1.7 K/UL (ref 1–5.1)
LYMPHOCYTES RELATIVE PERCENT: 12.5 %
MCH RBC QN AUTO: 29 PG (ref 26–34)
MCHC RBC AUTO-ENTMCNC: 34.4 G/DL (ref 31–36)
MCV RBC AUTO: 84.3 FL (ref 80–100)
MONOCYTES ABSOLUTE: 0.9 K/UL (ref 0–1.3)
MONOCYTES RELATIVE PERCENT: 6.4 %
NEUTROPHILS ABSOLUTE: 10.9 K/UL (ref 1.7–7.7)
NEUTROPHILS RELATIVE PERCENT: 80.1 %
PDW BLD-RTO: 14.4 % (ref 12.4–15.4)
PLATELET # BLD: 284 K/UL (ref 135–450)
PMV BLD AUTO: 8.6 FL (ref 5–10.5)
POTASSIUM REFLEX MAGNESIUM: 3.8 MMOL/L (ref 3.5–5.1)
RBC # BLD: 4.77 M/UL (ref 4–5.2)
SODIUM BLD-SCNC: 140 MMOL/L (ref 136–145)
WBC # BLD: 13.5 K/UL (ref 4–11)

## 2021-07-25 PROCEDURE — 84703 CHORIONIC GONADOTROPIN ASSAY: CPT

## 2021-07-25 PROCEDURE — 36415 COLL VENOUS BLD VENIPUNCTURE: CPT

## 2021-07-25 PROCEDURE — 80048 BASIC METABOLIC PNL TOTAL CA: CPT

## 2021-07-25 PROCEDURE — 85025 COMPLETE CBC W/AUTO DIFF WBC: CPT

## 2021-07-25 PROCEDURE — 85610 PROTHROMBIN TIME: CPT

## 2021-07-25 PROCEDURE — 73630 X-RAY EXAM OF FOOT: CPT

## 2021-07-25 PROCEDURE — 93005 ELECTROCARDIOGRAM TRACING: CPT | Performed by: EMERGENCY MEDICINE

## 2021-07-25 PROCEDURE — 96374 THER/PROPH/DIAG INJ IV PUSH: CPT

## 2021-07-25 PROCEDURE — 99285 EMERGENCY DEPT VISIT HI MDM: CPT

## 2021-07-25 RX ORDER — 0.9 % SODIUM CHLORIDE 0.9 %
1000 INTRAVENOUS SOLUTION INTRAVENOUS ONCE
Status: COMPLETED | OUTPATIENT
Start: 2021-07-25 | End: 2021-07-26

## 2021-07-25 RX ORDER — NORGESTIMATE AND ETHINYL ESTRADIOL 0.25-0.035
1 KIT ORAL DAILY
COMMUNITY

## 2021-07-25 ASSESSMENT — ENCOUNTER SYMPTOMS
VOMITING: 0
SHORTNESS OF BREATH: 0
NAUSEA: 0

## 2021-07-25 ASSESSMENT — PAIN SCALES - GENERAL: PAINLEVEL_OUTOF10: 8

## 2021-07-25 ASSESSMENT — PAIN DESCRIPTION - LOCATION: LOCATION: FOOT

## 2021-07-25 ASSESSMENT — PAIN DESCRIPTION - ORIENTATION: ORIENTATION: RIGHT

## 2021-07-25 NOTE — LETTER
[address] [facility]  [address] [address]  Phone: [phone]      July 27, 2021     Patient: Jade Vasquez   YOB: 1995   Date of Visit: 7/25/2021       To Whom It May Concern: It is my medical opinion that Jade Vasquez should remain out of work until 08/14/2021. If you have any questions or concerns, please don't hesitate to call.     Sincerely,        Rosamaria Presley DPM

## 2021-07-26 ENCOUNTER — APPOINTMENT (OUTPATIENT)
Dept: CT IMAGING | Age: 26
DRG: 505 | End: 2021-07-26
Payer: COMMERCIAL

## 2021-07-26 ENCOUNTER — ANESTHESIA (OUTPATIENT)
Dept: OPERATING ROOM | Age: 26
DRG: 505 | End: 2021-07-26
Payer: COMMERCIAL

## 2021-07-26 ENCOUNTER — APPOINTMENT (OUTPATIENT)
Dept: GENERAL RADIOLOGY | Age: 26
DRG: 505 | End: 2021-07-26
Payer: COMMERCIAL

## 2021-07-26 ENCOUNTER — ANESTHESIA EVENT (OUTPATIENT)
Dept: OPERATING ROOM | Age: 26
DRG: 505 | End: 2021-07-26
Payer: COMMERCIAL

## 2021-07-26 VITALS — SYSTOLIC BLOOD PRESSURE: 107 MMHG | DIASTOLIC BLOOD PRESSURE: 63 MMHG | OXYGEN SATURATION: 89 % | TEMPERATURE: 97.7 F

## 2021-07-26 PROBLEM — M25.571 ANKLE PAIN, RIGHT: Status: ACTIVE | Noted: 2021-07-26

## 2021-07-26 LAB
EKG ATRIAL RATE: 68 BPM
EKG DIAGNOSIS: NORMAL
EKG P AXIS: 74 DEGREES
EKG P-R INTERVAL: 158 MS
EKG Q-T INTERVAL: 400 MS
EKG QRS DURATION: 100 MS
EKG QTC CALCULATION (BAZETT): 425 MS
EKG R AXIS: 86 DEGREES
EKG T AXIS: 67 DEGREES
EKG VENTRICULAR RATE: 68 BPM
HCG QUALITATIVE: NEGATIVE
INR BLD: 0.95 (ref 0.88–1.12)
PROTHROMBIN TIME: 10.7 SEC (ref 9.9–12.7)

## 2021-07-26 PROCEDURE — 2500000003 HC RX 250 WO HCPCS: Performed by: NURSE ANESTHETIST, CERTIFIED REGISTERED

## 2021-07-26 PROCEDURE — 6360000002 HC RX W HCPCS: Performed by: INTERNAL MEDICINE

## 2021-07-26 PROCEDURE — 6360000002 HC RX W HCPCS: Performed by: NURSE ANESTHETIST, CERTIFIED REGISTERED

## 2021-07-26 PROCEDURE — 6360000002 HC RX W HCPCS: Performed by: ANESTHESIOLOGY

## 2021-07-26 PROCEDURE — 2500000003 HC RX 250 WO HCPCS: Performed by: ANESTHESIOLOGY

## 2021-07-26 PROCEDURE — C1734 ORTH/DEVIC/DRUG BN/BN,TIS/BN: HCPCS | Performed by: PODIATRIST

## 2021-07-26 PROCEDURE — 64445 NJX AA&/STRD SCIATIC NRV IMG: CPT | Performed by: ANESTHESIOLOGY

## 2021-07-26 PROCEDURE — 6370000000 HC RX 637 (ALT 250 FOR IP): Performed by: PODIATRIST

## 2021-07-26 PROCEDURE — 1200000000 HC SEMI PRIVATE

## 2021-07-26 PROCEDURE — C1713 ANCHOR/SCREW BN/BN,TIS/BN: HCPCS | Performed by: PODIATRIST

## 2021-07-26 PROCEDURE — 3700000001 HC ADD 15 MINUTES (ANESTHESIA): Performed by: PODIATRIST

## 2021-07-26 PROCEDURE — 7100000001 HC PACU RECOVERY - ADDTL 15 MIN: Performed by: PODIATRIST

## 2021-07-26 PROCEDURE — 3700000000 HC ANESTHESIA ATTENDED CARE: Performed by: PODIATRIST

## 2021-07-26 PROCEDURE — 71046 X-RAY EXAM CHEST 2 VIEWS: CPT

## 2021-07-26 PROCEDURE — 73630 X-RAY EXAM OF FOOT: CPT

## 2021-07-26 PROCEDURE — 2580000003 HC RX 258: Performed by: INTERNAL MEDICINE

## 2021-07-26 PROCEDURE — 2720000010 HC SURG SUPPLY STERILE: Performed by: PODIATRIST

## 2021-07-26 PROCEDURE — 3600000014 HC SURGERY LEVEL 4 ADDTL 15MIN: Performed by: PODIATRIST

## 2021-07-26 PROCEDURE — 73700 CT LOWER EXTREMITY W/O DYE: CPT

## 2021-07-26 PROCEDURE — C1769 GUIDE WIRE: HCPCS | Performed by: PODIATRIST

## 2021-07-26 PROCEDURE — 2580000003 HC RX 258: Performed by: NURSE ANESTHETIST, CERTIFIED REGISTERED

## 2021-07-26 PROCEDURE — 3600000004 HC SURGERY LEVEL 4 BASE: Performed by: PODIATRIST

## 2021-07-26 PROCEDURE — 2500000003 HC RX 250 WO HCPCS: Performed by: PODIATRIST

## 2021-07-26 PROCEDURE — 0SGK0KZ FUSION OF RIGHT TARSOMETATARSAL JOINT WITH NONAUTOLOGOUS TISSUE SUBSTITUTE, OPEN APPROACH: ICD-10-PCS | Performed by: PODIATRIST

## 2021-07-26 PROCEDURE — 6360000002 HC RX W HCPCS: Performed by: PODIATRIST

## 2021-07-26 PROCEDURE — 3E0T3BZ INTRODUCTION OF ANESTHETIC AGENT INTO PERIPHERAL NERVES AND PLEXI, PERCUTANEOUS APPROACH: ICD-10-PCS | Performed by: ANESTHESIOLOGY

## 2021-07-26 PROCEDURE — 0QSN04Z REPOSITION RIGHT METATARSAL WITH INTERNAL FIXATION DEVICE, OPEN APPROACH: ICD-10-PCS | Performed by: PODIATRIST

## 2021-07-26 PROCEDURE — 7100000000 HC PACU RECOVERY - FIRST 15 MIN: Performed by: PODIATRIST

## 2021-07-26 PROCEDURE — 2709999900 HC NON-CHARGEABLE SUPPLY: Performed by: PODIATRIST

## 2021-07-26 PROCEDURE — 2580000003 HC RX 258: Performed by: EMERGENCY MEDICINE

## 2021-07-26 DEVICE — LOCKING SCREW
Type: IMPLANTABLE DEVICE | Site: FOOT | Status: FUNCTIONAL
Brand: VARIAX

## 2021-07-26 DEVICE — SCREW BONE LK T6 2.0MM L12MM: Type: IMPLANTABLE DEVICE | Site: FOOT | Status: FUNCTIONAL

## 2021-07-26 DEVICE — LOCKING SCREW, FULLY THREADED,T8
Type: IMPLANTABLE DEVICE | Site: FOOT | Status: FUNCTIONAL
Brand: VARIAX

## 2021-07-26 DEVICE — HEADED SCREW
Type: IMPLANTABLE DEVICE | Site: FOOT | Status: FUNCTIONAL
Brand: MINI

## 2021-07-26 DEVICE — POLYAXIAL LOCKING PLATE -  UNIVERSAL CROSS-PLATE (T8)
Type: IMPLANTABLE DEVICE | Site: FOOT | Status: FUNCTIONAL
Brand: ANCHORAGE

## 2021-07-26 DEVICE — SCREW LK T6 2.0MM L16MM: Type: IMPLANTABLE DEVICE | Site: FOOT | Status: FUNCTIONAL

## 2021-07-26 DEVICE — TIM-GRAFT BONE AUGMENT 3ML INJ: Type: IMPLANTABLE DEVICE | Site: FOOT | Status: FUNCTIONAL

## 2021-07-26 DEVICE — IMPLANTABLE DEVICE: Type: IMPLANTABLE DEVICE | Site: FOOT | Status: FUNCTIONAL

## 2021-07-26 DEVICE — NARROW LOCKING PLATE, 2.0
Type: IMPLANTABLE DEVICE | Site: FOOT | Status: FUNCTIONAL
Brand: VARIAX

## 2021-07-26 DEVICE — SCREW LK T6 2.0MM L14MM: Type: IMPLANTABLE DEVICE | Site: FOOT | Status: FUNCTIONAL

## 2021-07-26 DEVICE — LOCKING SCREW, T6
Type: IMPLANTABLE DEVICE | Site: FOOT | Status: FUNCTIONAL
Brand: VARIAX

## 2021-07-26 DEVICE — CP LAG SCREW (T8)
Type: IMPLANTABLE DEVICE | Site: FOOT | Status: FUNCTIONAL
Brand: ANCHORAGE

## 2021-07-26 DEVICE — CP LAG SCREW (T10)
Type: IMPLANTABLE DEVICE | Site: FOOT | Status: FUNCTIONAL
Brand: ANCHORAGE

## 2021-07-26 DEVICE — BIOACTIVE BONE GRAFT SUBSTITUTE, FOAM PACK; BETA-TRICALCIUM PHOSPHATE, TYPE I BOVINE COLLAGEN, AND BIOACTIVE GLASS
Type: IMPLANTABLE DEVICE | Site: FOOT | Status: FUNCTIONAL
Brand: VITOSS BBTRAUMA

## 2021-07-26 DEVICE — POLYAXIAL LOCKING PLATE -  LAPIDUS CROSS-PLATE, RIGHT (T10)
Type: IMPLANTABLE DEVICE | Site: FOOT | Status: FUNCTIONAL
Brand: ANCHORAGE

## 2021-07-26 RX ORDER — NALOXONE HYDROCHLORIDE 0.4 MG/ML
0.4 INJECTION, SOLUTION INTRAMUSCULAR; INTRAVENOUS; SUBCUTANEOUS PRN
Status: DISCONTINUED | OUTPATIENT
Start: 2021-07-26 | End: 2021-07-27 | Stop reason: HOSPADM

## 2021-07-26 RX ORDER — FENTANYL CITRATE 50 UG/ML
100 INJECTION, SOLUTION INTRAMUSCULAR; INTRAVENOUS ONCE
Status: DISCONTINUED | OUTPATIENT
Start: 2021-07-26 | End: 2021-07-27 | Stop reason: HOSPADM

## 2021-07-26 RX ORDER — ONDANSETRON 2 MG/ML
4 INJECTION INTRAMUSCULAR; INTRAVENOUS EVERY 6 HOURS PRN
Status: DISCONTINUED | OUTPATIENT
Start: 2021-07-26 | End: 2021-07-27 | Stop reason: HOSPADM

## 2021-07-26 RX ORDER — LIDOCAINE HCL/PF 100 MG/5ML
SYRINGE (ML) INJECTION PRN
Status: DISCONTINUED | OUTPATIENT
Start: 2021-07-26 | End: 2021-07-26 | Stop reason: SDUPTHER

## 2021-07-26 RX ORDER — MORPHINE SULFATE 2 MG/ML
2 INJECTION, SOLUTION INTRAMUSCULAR; INTRAVENOUS
Status: DISCONTINUED | OUTPATIENT
Start: 2021-07-26 | End: 2021-07-26

## 2021-07-26 RX ORDER — OXYCODONE HYDROCHLORIDE AND ACETAMINOPHEN 5; 325 MG/1; MG/1
2 TABLET ORAL EVERY 4 HOURS PRN
Status: DISCONTINUED | OUTPATIENT
Start: 2021-07-26 | End: 2021-07-27 | Stop reason: HOSPADM

## 2021-07-26 RX ORDER — SODIUM CHLORIDE, SODIUM LACTATE, POTASSIUM CHLORIDE, CALCIUM CHLORIDE 600; 310; 30; 20 MG/100ML; MG/100ML; MG/100ML; MG/100ML
INJECTION, SOLUTION INTRAVENOUS CONTINUOUS PRN
Status: DISCONTINUED | OUTPATIENT
Start: 2021-07-26 | End: 2021-07-26 | Stop reason: SDUPTHER

## 2021-07-26 RX ORDER — DIPHENHYDRAMINE HYDROCHLORIDE 50 MG/ML
12.5 INJECTION INTRAMUSCULAR; INTRAVENOUS
Status: DISCONTINUED | OUTPATIENT
Start: 2021-07-26 | End: 2021-07-26 | Stop reason: HOSPADM

## 2021-07-26 RX ORDER — MORPHINE SULFATE 2 MG/ML
2 INJECTION, SOLUTION INTRAMUSCULAR; INTRAVENOUS ONCE
Status: COMPLETED | OUTPATIENT
Start: 2021-07-26 | End: 2021-07-26

## 2021-07-26 RX ORDER — BUPIVACAINE HYDROCHLORIDE 5 MG/ML
INJECTION, SOLUTION EPIDURAL; INTRACAUDAL
Status: COMPLETED | OUTPATIENT
Start: 2021-07-26 | End: 2021-07-26

## 2021-07-26 RX ORDER — MIDAZOLAM HYDROCHLORIDE 1 MG/ML
INJECTION INTRAMUSCULAR; INTRAVENOUS PRN
Status: DISCONTINUED | OUTPATIENT
Start: 2021-07-26 | End: 2021-07-26 | Stop reason: SDUPTHER

## 2021-07-26 RX ORDER — FENTANYL CITRATE 50 UG/ML
INJECTION, SOLUTION INTRAMUSCULAR; INTRAVENOUS PRN
Status: DISCONTINUED | OUTPATIENT
Start: 2021-07-26 | End: 2021-07-26 | Stop reason: SDUPTHER

## 2021-07-26 RX ORDER — SODIUM CHLORIDE 9 MG/ML
25 INJECTION, SOLUTION INTRAVENOUS PRN
Status: DISCONTINUED | OUTPATIENT
Start: 2021-07-26 | End: 2021-07-27 | Stop reason: HOSPADM

## 2021-07-26 RX ORDER — DIPHENHYDRAMINE HYDROCHLORIDE 50 MG/ML
INJECTION INTRAMUSCULAR; INTRAVENOUS PRN
Status: DISCONTINUED | OUTPATIENT
Start: 2021-07-26 | End: 2021-07-26 | Stop reason: SDUPTHER

## 2021-07-26 RX ORDER — SODIUM CHLORIDE, SODIUM LACTATE, POTASSIUM CHLORIDE, CALCIUM CHLORIDE 600; 310; 30; 20 MG/100ML; MG/100ML; MG/100ML; MG/100ML
INJECTION, SOLUTION INTRAVENOUS CONTINUOUS
Status: ACTIVE | OUTPATIENT
Start: 2021-07-26 | End: 2021-07-26

## 2021-07-26 RX ORDER — OXYCODONE HYDROCHLORIDE AND ACETAMINOPHEN 5; 325 MG/1; MG/1
2 TABLET ORAL EVERY 4 HOURS PRN
Status: DISCONTINUED | OUTPATIENT
Start: 2021-07-26 | End: 2021-07-26 | Stop reason: SDUPTHER

## 2021-07-26 RX ORDER — OXYCODONE HYDROCHLORIDE 5 MG/1
5 TABLET ORAL PRN
Status: DISCONTINUED | OUTPATIENT
Start: 2021-07-26 | End: 2021-07-26 | Stop reason: HOSPADM

## 2021-07-26 RX ORDER — PROMETHAZINE HYDROCHLORIDE 25 MG/ML
6.25 INJECTION, SOLUTION INTRAMUSCULAR; INTRAVENOUS
Status: DISCONTINUED | OUTPATIENT
Start: 2021-07-26 | End: 2021-07-26 | Stop reason: HOSPADM

## 2021-07-26 RX ORDER — POLYETHYLENE GLYCOL 3350 17 G/17G
17 POWDER, FOR SOLUTION ORAL DAILY PRN
Status: DISCONTINUED | OUTPATIENT
Start: 2021-07-26 | End: 2021-07-27 | Stop reason: HOSPADM

## 2021-07-26 RX ORDER — LABETALOL HYDROCHLORIDE 5 MG/ML
5 INJECTION, SOLUTION INTRAVENOUS EVERY 10 MIN PRN
Status: DISCONTINUED | OUTPATIENT
Start: 2021-07-26 | End: 2021-07-26 | Stop reason: HOSPADM

## 2021-07-26 RX ORDER — OXYCODONE HYDROCHLORIDE 5 MG/1
10 TABLET ORAL PRN
Status: DISCONTINUED | OUTPATIENT
Start: 2021-07-26 | End: 2021-07-26 | Stop reason: HOSPADM

## 2021-07-26 RX ORDER — SODIUM CHLORIDE 0.9 % (FLUSH) 0.9 %
5-40 SYRINGE (ML) INJECTION EVERY 12 HOURS SCHEDULED
Status: DISCONTINUED | OUTPATIENT
Start: 2021-07-26 | End: 2021-07-27 | Stop reason: HOSPADM

## 2021-07-26 RX ORDER — METOCLOPRAMIDE HYDROCHLORIDE 5 MG/ML
10 INJECTION INTRAMUSCULAR; INTRAVENOUS
Status: COMPLETED | OUTPATIENT
Start: 2021-07-26 | End: 2021-07-26

## 2021-07-26 RX ORDER — MIDAZOLAM HYDROCHLORIDE 1 MG/ML
2 INJECTION INTRAMUSCULAR; INTRAVENOUS ONCE
Status: DISCONTINUED | OUTPATIENT
Start: 2021-07-26 | End: 2021-07-27 | Stop reason: HOSPADM

## 2021-07-26 RX ORDER — SODIUM CHLORIDE 0.9 % (FLUSH) 0.9 %
5-40 SYRINGE (ML) INJECTION PRN
Status: DISCONTINUED | OUTPATIENT
Start: 2021-07-26 | End: 2021-07-27 | Stop reason: HOSPADM

## 2021-07-26 RX ORDER — MORPHINE SULFATE 4 MG/ML
1 INJECTION, SOLUTION INTRAMUSCULAR; INTRAVENOUS EVERY 5 MIN PRN
Status: DISCONTINUED | OUTPATIENT
Start: 2021-07-26 | End: 2021-07-26 | Stop reason: HOSPADM

## 2021-07-26 RX ORDER — BUPIVACAINE HYDROCHLORIDE AND EPINEPHRINE 5; 5 MG/ML; UG/ML
INJECTION, SOLUTION EPIDURAL; INTRACAUDAL; PERINEURAL PRN
Status: DISCONTINUED | OUTPATIENT
Start: 2021-07-26 | End: 2021-07-26 | Stop reason: ALTCHOICE

## 2021-07-26 RX ORDER — ACETAMINOPHEN 325 MG/1
650 TABLET ORAL EVERY 6 HOURS PRN
Status: DISCONTINUED | OUTPATIENT
Start: 2021-07-26 | End: 2021-07-27 | Stop reason: HOSPADM

## 2021-07-26 RX ORDER — ONDANSETRON 2 MG/ML
INJECTION INTRAMUSCULAR; INTRAVENOUS PRN
Status: DISCONTINUED | OUTPATIENT
Start: 2021-07-26 | End: 2021-07-26 | Stop reason: SDUPTHER

## 2021-07-26 RX ORDER — ONDANSETRON 4 MG/1
4 TABLET, ORALLY DISINTEGRATING ORAL EVERY 8 HOURS PRN
Status: DISCONTINUED | OUTPATIENT
Start: 2021-07-26 | End: 2021-07-27 | Stop reason: HOSPADM

## 2021-07-26 RX ORDER — DOCUSATE SODIUM 100 MG/1
100 CAPSULE, LIQUID FILLED ORAL 2 TIMES DAILY PRN
Status: DISCONTINUED | OUTPATIENT
Start: 2021-07-26 | End: 2021-07-27 | Stop reason: HOSPADM

## 2021-07-26 RX ORDER — MEPERIDINE HYDROCHLORIDE 25 MG/ML
12.5 INJECTION INTRAMUSCULAR; INTRAVENOUS; SUBCUTANEOUS EVERY 5 MIN PRN
Status: DISCONTINUED | OUTPATIENT
Start: 2021-07-26 | End: 2021-07-26 | Stop reason: HOSPADM

## 2021-07-26 RX ORDER — OXYCODONE HYDROCHLORIDE AND ACETAMINOPHEN 5; 325 MG/1; MG/1
1 TABLET ORAL EVERY 4 HOURS PRN
Status: DISCONTINUED | OUTPATIENT
Start: 2021-07-26 | End: 2021-07-27 | Stop reason: HOSPADM

## 2021-07-26 RX ORDER — PROPOFOL 10 MG/ML
INJECTION, EMULSION INTRAVENOUS PRN
Status: DISCONTINUED | OUTPATIENT
Start: 2021-07-26 | End: 2021-07-26 | Stop reason: SDUPTHER

## 2021-07-26 RX ORDER — HYDROMORPHONE HCL 110MG/55ML
PATIENT CONTROLLED ANALGESIA SYRINGE INTRAVENOUS PRN
Status: DISCONTINUED | OUTPATIENT
Start: 2021-07-26 | End: 2021-07-26 | Stop reason: SDUPTHER

## 2021-07-26 RX ORDER — OXYCODONE HYDROCHLORIDE AND ACETAMINOPHEN 5; 325 MG/1; MG/1
1 TABLET ORAL EVERY 4 HOURS PRN
Status: DISCONTINUED | OUTPATIENT
Start: 2021-07-26 | End: 2021-07-26 | Stop reason: SDUPTHER

## 2021-07-26 RX ORDER — MORPHINE SULFATE 2 MG/ML
4 INJECTION, SOLUTION INTRAMUSCULAR; INTRAVENOUS EVERY 4 HOURS PRN
Status: DISCONTINUED | OUTPATIENT
Start: 2021-07-26 | End: 2021-07-27

## 2021-07-26 RX ORDER — CEFAZOLIN SODIUM 2 G/50ML
SOLUTION INTRAVENOUS PRN
Status: DISCONTINUED | OUTPATIENT
Start: 2021-07-26 | End: 2021-07-26 | Stop reason: SDUPTHER

## 2021-07-26 RX ORDER — HYDRALAZINE HYDROCHLORIDE 20 MG/ML
5 INJECTION INTRAMUSCULAR; INTRAVENOUS EVERY 10 MIN PRN
Status: DISCONTINUED | OUTPATIENT
Start: 2021-07-26 | End: 2021-07-26 | Stop reason: HOSPADM

## 2021-07-26 RX ORDER — ACETAMINOPHEN 650 MG/1
650 SUPPOSITORY RECTAL EVERY 6 HOURS PRN
Status: DISCONTINUED | OUTPATIENT
Start: 2021-07-26 | End: 2021-07-27 | Stop reason: HOSPADM

## 2021-07-26 RX ADMIN — OXYCODONE HYDROCHLORIDE AND ACETAMINOPHEN 2 TABLET: 5; 325 TABLET ORAL at 09:20

## 2021-07-26 RX ADMIN — MORPHINE SULFATE 2 MG: 2 INJECTION, SOLUTION INTRAMUSCULAR; INTRAVENOUS at 05:50

## 2021-07-26 RX ADMIN — SODIUM CHLORIDE, SODIUM LACTATE, POTASSIUM CHLORIDE, AND CALCIUM CHLORIDE: .6; .31; .03; .02 INJECTION, SOLUTION INTRAVENOUS at 13:18

## 2021-07-26 RX ADMIN — FENTANYL CITRATE 100 MCG: 50 INJECTION, SOLUTION INTRAMUSCULAR; INTRAVENOUS at 14:25

## 2021-07-26 RX ADMIN — SODIUM CHLORIDE, POTASSIUM CHLORIDE, SODIUM LACTATE AND CALCIUM CHLORIDE: 600; 310; 30; 20 INJECTION, SOLUTION INTRAVENOUS at 04:32

## 2021-07-26 RX ADMIN — CEFAZOLIN SODIUM 2000 MG: 2 SOLUTION INTRAVENOUS at 17:55

## 2021-07-26 RX ADMIN — FAMOTIDINE 20 MG: 10 INJECTION, SOLUTION INTRAVENOUS at 14:48

## 2021-07-26 RX ADMIN — FENTANYL CITRATE 50 MCG: 50 INJECTION, SOLUTION INTRAMUSCULAR; INTRAVENOUS at 14:48

## 2021-07-26 RX ADMIN — SODIUM CHLORIDE 1000 ML: 0.9 INJECTION, SOLUTION INTRAVENOUS at 00:16

## 2021-07-26 RX ADMIN — OXYCODONE HYDROCHLORIDE AND ACETAMINOPHEN 1 TABLET: 5; 325 TABLET ORAL at 04:40

## 2021-07-26 RX ADMIN — DIPHENHYDRAMINE HYDROCHLORIDE 12.5 MG: 50 INJECTION, SOLUTION INTRAMUSCULAR; INTRAVENOUS at 18:45

## 2021-07-26 RX ADMIN — Medication 80 MG: at 14:48

## 2021-07-26 RX ADMIN — PROPOFOL 200 MG: 10 INJECTION, EMULSION INTRAVENOUS at 14:48

## 2021-07-26 RX ADMIN — METOCLOPRAMIDE HYDROCHLORIDE 10 MG: 5 INJECTION INTRAMUSCULAR; INTRAVENOUS at 19:43

## 2021-07-26 RX ADMIN — SODIUM CHLORIDE, SODIUM LACTATE, POTASSIUM CHLORIDE, AND CALCIUM CHLORIDE: .6; .31; .03; .02 INJECTION, SOLUTION INTRAVENOUS at 15:56

## 2021-07-26 RX ADMIN — DOCUSATE SODIUM 100 MG: 100 CAPSULE, LIQUID FILLED ORAL at 22:23

## 2021-07-26 RX ADMIN — OXYCODONE HYDROCHLORIDE AND ACETAMINOPHEN 2 TABLET: 5; 325 TABLET ORAL at 22:21

## 2021-07-26 RX ADMIN — BUPIVACAINE HYDROCHLORIDE 40 ML: 5 INJECTION, SOLUTION EPIDURAL; INTRACAUDAL; PERINEURAL at 14:29

## 2021-07-26 RX ADMIN — MORPHINE SULFATE 2 MG: 2 INJECTION, SOLUTION INTRAMUSCULAR; INTRAVENOUS at 11:54

## 2021-07-26 RX ADMIN — CEFAZOLIN SODIUM 2000 MG: 2 SOLUTION INTRAVENOUS at 14:53

## 2021-07-26 RX ADMIN — FENTANYL CITRATE 50 MCG: 50 INJECTION, SOLUTION INTRAMUSCULAR; INTRAVENOUS at 17:19

## 2021-07-26 RX ADMIN — MIDAZOLAM HYDROCHLORIDE 2 MG: 2 INJECTION, SOLUTION INTRAMUSCULAR; INTRAVENOUS at 14:25

## 2021-07-26 RX ADMIN — ONDANSETRON 4 MG: 2 INJECTION INTRAMUSCULAR; INTRAVENOUS at 14:48

## 2021-07-26 RX ADMIN — HYDROMORPHONE HYDROCHLORIDE 0.8 MG: 2 INJECTION, SOLUTION INTRAMUSCULAR; INTRAVENOUS; SUBCUTANEOUS at 19:12

## 2021-07-26 RX ADMIN — MORPHINE SULFATE 2 MG: 2 INJECTION, SOLUTION INTRAMUSCULAR; INTRAVENOUS at 02:31

## 2021-07-26 ASSESSMENT — PAIN SCALES - GENERAL
PAINLEVEL_OUTOF10: 7
PAINLEVEL_OUTOF10: 9
PAINLEVEL_OUTOF10: 0
PAINLEVEL_OUTOF10: 0
PAINLEVEL_OUTOF10: 7
PAINLEVEL_OUTOF10: 7
PAINLEVEL_OUTOF10: 8
PAINLEVEL_OUTOF10: 9
PAINLEVEL_OUTOF10: 0
PAINLEVEL_OUTOF10: 5

## 2021-07-26 ASSESSMENT — PULMONARY FUNCTION TESTS
PIF_VALUE: 4
PIF_VALUE: 3
PIF_VALUE: 4
PIF_VALUE: 5
PIF_VALUE: 4
PIF_VALUE: 3
PIF_VALUE: 4
PIF_VALUE: 15
PIF_VALUE: 7
PIF_VALUE: 4
PIF_VALUE: 10
PIF_VALUE: 0
PIF_VALUE: 3
PIF_VALUE: 4
PIF_VALUE: 15
PIF_VALUE: 3
PIF_VALUE: 4
PIF_VALUE: 0
PIF_VALUE: 3
PIF_VALUE: 4
PIF_VALUE: 15
PIF_VALUE: 4
PIF_VALUE: 1
PIF_VALUE: 4
PIF_VALUE: 8
PIF_VALUE: 4
PIF_VALUE: 15
PIF_VALUE: 4
PIF_VALUE: 3
PIF_VALUE: 4
PIF_VALUE: 3
PIF_VALUE: 4
PIF_VALUE: 3
PIF_VALUE: 4
PIF_VALUE: 14
PIF_VALUE: 4
PIF_VALUE: 5
PIF_VALUE: 3
PIF_VALUE: 4
PIF_VALUE: 3
PIF_VALUE: 4
PIF_VALUE: 1
PIF_VALUE: 3
PIF_VALUE: 1
PIF_VALUE: 4
PIF_VALUE: 3
PIF_VALUE: 4
PIF_VALUE: 5
PIF_VALUE: 4
PIF_VALUE: 4
PIF_VALUE: 2
PIF_VALUE: 4
PIF_VALUE: 4
PIF_VALUE: 3
PIF_VALUE: 3
PIF_VALUE: 4
PIF_VALUE: 4
PIF_VALUE: 3
PIF_VALUE: 3
PIF_VALUE: 4
PIF_VALUE: 3
PIF_VALUE: 4
PIF_VALUE: 15
PIF_VALUE: 4
PIF_VALUE: 15
PIF_VALUE: 3
PIF_VALUE: 4
PIF_VALUE: 14
PIF_VALUE: 4
PIF_VALUE: 3
PIF_VALUE: 3
PIF_VALUE: 15
PIF_VALUE: 4
PIF_VALUE: 2
PIF_VALUE: 4
PIF_VALUE: 4
PIF_VALUE: 2
PIF_VALUE: 15
PIF_VALUE: 5
PIF_VALUE: 4
PIF_VALUE: 3
PIF_VALUE: 4
PIF_VALUE: 1
PIF_VALUE: 4
PIF_VALUE: 3
PIF_VALUE: 4
PIF_VALUE: 2
PIF_VALUE: 4
PIF_VALUE: 4
PIF_VALUE: 3
PIF_VALUE: 4
PIF_VALUE: 3
PIF_VALUE: 4
PIF_VALUE: 5
PIF_VALUE: 3
PIF_VALUE: 4
PIF_VALUE: 2
PIF_VALUE: 4
PIF_VALUE: 0
PIF_VALUE: 3
PIF_VALUE: 3
PIF_VALUE: 4
PIF_VALUE: 3
PIF_VALUE: 4
PIF_VALUE: 1
PIF_VALUE: 4
PIF_VALUE: 4
PIF_VALUE: 6
PIF_VALUE: 4
PIF_VALUE: 3
PIF_VALUE: 3
PIF_VALUE: 4
PIF_VALUE: 5
PIF_VALUE: 4
PIF_VALUE: 3
PIF_VALUE: 4
PIF_VALUE: 3
PIF_VALUE: 5
PIF_VALUE: 4
PIF_VALUE: 15
PIF_VALUE: 4
PIF_VALUE: 4
PIF_VALUE: 3
PIF_VALUE: 1
PIF_VALUE: 3
PIF_VALUE: 4
PIF_VALUE: 4
PIF_VALUE: 3
PIF_VALUE: 4
PIF_VALUE: 5
PIF_VALUE: 4
PIF_VALUE: 5
PIF_VALUE: 3
PIF_VALUE: 4
PIF_VALUE: 5
PIF_VALUE: 3
PIF_VALUE: 4
PIF_VALUE: 1
PIF_VALUE: 4
PIF_VALUE: 3
PIF_VALUE: 4
PIF_VALUE: 6
PIF_VALUE: 3
PIF_VALUE: 4
PIF_VALUE: 4
PIF_VALUE: 3
PIF_VALUE: 5
PIF_VALUE: 4
PIF_VALUE: 3
PIF_VALUE: 4
PIF_VALUE: 15
PIF_VALUE: 4
PIF_VALUE: 19
PIF_VALUE: 4
PIF_VALUE: 3
PIF_VALUE: 3
PIF_VALUE: 4
PIF_VALUE: 3
PIF_VALUE: 3
PIF_VALUE: 4
PIF_VALUE: 3
PIF_VALUE: 4
PIF_VALUE: 3
PIF_VALUE: 4
PIF_VALUE: 5
PIF_VALUE: 3
PIF_VALUE: 3
PIF_VALUE: 4
PIF_VALUE: 14
PIF_VALUE: 7
PIF_VALUE: 5
PIF_VALUE: 7
PIF_VALUE: 4
PIF_VALUE: 3

## 2021-07-26 ASSESSMENT — ENCOUNTER SYMPTOMS
DIARRHEA: 0
COUGH: 0
NAUSEA: 0
VOMITING: 0
SORE THROAT: 0
BLOOD IN STOOL: 0
COLOR CHANGE: 0
SHORTNESS OF BREATH: 0
CHEST TIGHTNESS: 0
ABDOMINAL PAIN: 0
CHOKING: 0

## 2021-07-26 ASSESSMENT — PAIN DESCRIPTION - PAIN TYPE
TYPE: ACUTE PAIN

## 2021-07-26 ASSESSMENT — PAIN DESCRIPTION - FREQUENCY
FREQUENCY: INTERMITTENT
FREQUENCY: INTERMITTENT
FREQUENCY: CONTINUOUS

## 2021-07-26 ASSESSMENT — PAIN DESCRIPTION - ONSET
ONSET: ON-GOING

## 2021-07-26 ASSESSMENT — PAIN DESCRIPTION - PROGRESSION
CLINICAL_PROGRESSION: NOT CHANGED
CLINICAL_PROGRESSION: GRADUALLY WORSENING
CLINICAL_PROGRESSION: GRADUALLY WORSENING

## 2021-07-26 ASSESSMENT — PAIN - FUNCTIONAL ASSESSMENT
PAIN_FUNCTIONAL_ASSESSMENT: PREVENTS OR INTERFERES SOME ACTIVE ACTIVITIES AND ADLS
PAIN_FUNCTIONAL_ASSESSMENT: PREVENTS OR INTERFERES SOME ACTIVE ACTIVITIES AND ADLS

## 2021-07-26 ASSESSMENT — PAIN DESCRIPTION - LOCATION
LOCATION: ANKLE
LOCATION: FOOT

## 2021-07-26 ASSESSMENT — PAIN DESCRIPTION - DESCRIPTORS
DESCRIPTORS: ACHING
DESCRIPTORS: RADIATING;SHARP
DESCRIPTORS: SORE

## 2021-07-26 ASSESSMENT — PAIN DESCRIPTION - ORIENTATION
ORIENTATION: RIGHT

## 2021-07-26 NOTE — PROGRESS NOTES
4 Eyes Admission Assessment     I agree as the admission nurse that 2 RN's have performed a thorough Head to Toe Skin Assessment on the patient. ALL assessment sites listed below have been assessed on admission. Areas assessed by both nurses:   [x]   Head, Face, and Ears   [x]   Shoulders, Back, and Chest  [x]   Arms, Elbows, and Hands   [x]   Coccyx, Sacrum, and Ischium  [x]   Legs, Feet, and Heels        Does the Patient have Skin Breakdown?   No         Garfield Prevention initiated:  No   Wound Care Orders initiated:  No      [facility] nurse consulted for Pressure Injury (Stage 3,4, Unstageable, DTI, NWPT, and Complex wounds) or Garfield score 18 or lower:  No      Nurse 1 eSignature: Electronically signed by Susu Clements RN on 7/26/21 at 5:08 AM EDT    **SHARE this note so that the co-signing nurse is able to place an eSignature**    Nurse 2 eSignature: Electronically signed by Antony Rincon RN on 7/26/21 at 5:09 AM EDT

## 2021-07-26 NOTE — ANESTHESIA PROCEDURE NOTES
Peripheral Block    Patient location during procedure: pre-op  Start time: 7/26/2021 2:25 PM  End time: 7/26/2021 2:34 PM  Staffing  Performed: anesthesiologist   Anesthesiologist: Johnathon Sanchez MD  Preanesthetic Checklist  Completed: patient identified, IV checked, site marked, risks and benefits discussed, surgical consent, monitors and equipment checked, pre-op evaluation, timeout performed, anesthesia consent given, oxygen available and patient being monitored  Peripheral Block  Patient position: supine  Prep: ChloraPrep  Patient monitoring: cardiac monitor, continuous pulse ox, frequent blood pressure checks and IV access  Block type: Sciatic  Laterality: right  Injection technique: single-shot  Guidance: ultrasound guided  Popliteal  Provider prep: mask and sterile gloves  Needle  Needle type: short-bevel   Needle gauge: 22 G  Needle length: 8 cm  Needle localization: ultrasound guidance  Assessment  Injection assessment: negative aspiration for heme, no paresthesia on injection and local visualized surrounding nerve on ultrasound  Paresthesia pain: none  Slow fractionated injection: yes  Hemodynamics: stable  Additional Notes  Immediately prior to procedure a \"time out\" was called to verify the correct patient, allergies, laterality, procedure and equipment. Time out performed with RN. Local Anesthetic: See below    Biceps Femoris muscle (long head), Vastus lateralis muscle, Sciatic nerve (Tibia and Common Peroneal Nerves) and Popliteal artery are identified; the tip of the needle and the spread of the local anesthetic around the Tibial and Common Peroneal Nerve are visualized. The Sciatic Nerve (Tibia and Common Peroneal Nerve) appeared to be anatomically normal and there were no abnormal pathologically findings seen.      Ultrasound-Guided Right Popliteal Fossa Sciatic Nerve Block    Indication: Postoperative analgesia upon surgeon request.    Procedure: Informed consent obtained and timeout procedure performed. Patient supine. Landmarks identified. Sterile prep and drape. A 22G 80mm insulated regional block needle was inserted through the skin on the lateral aspect of the right thigh approximately 10cm cephalad of the popliteal crease. The needle was advanced in plane under ultrasound visualization laterally to medially toward the sciatic nerve proximal to the bifurcation. Bupivacaine 0.5% was then injected inside the nerve sheath under ultrasound guidance to a total volume of 40cc with frequent aspirations on the block needle that were all negative for heme. The patient tolerated the procedure well. There were no apparent complications at the time of the block. Medications Administered  Bupivacaine (MARCAINE) PF injection 0.5%, 40 mL  Reason for block: post-op pain management and at surgeon's request            Esther Cline.  Ynes Conde MD  July 26, 2021 4:06 PM

## 2021-07-26 NOTE — PROGRESS NOTES
Patient to pacu 7 pre op OPEN REDUCTION INTERNAL FIXATION VS ARTHRODESIS RIGHT FOOD - Right, all vitals stable at this time, can wiggle right toe.

## 2021-07-26 NOTE — PROGRESS NOTES
Pt arrived from ER to room 5320. VSS, placed on tele, NSR. R. Foot splint is CDI. Pt rated pain about 5/10, PRN percocet given. NPO for surgery later this afternoon. IVF infusing at this time. Will continue to monitor.

## 2021-07-26 NOTE — PROGRESS NOTES
Patient received from OR to PACU#13 post LISFRANC ARTHRODESIS, ORIF METATARSALS 3, 4, SECOND RAY ARTHRODESIS, APPLICATION OF BELOW KNEE SPLINT, RIGHT FOOT - Right of Dr. Laura Weller. Patient is placed on cardiac monitor. Report is given as per CRNA. Per report, patient was stable during procedure. On arrival, patient is arousable and denies pain.

## 2021-07-26 NOTE — OP NOTE
Operative Note    Patient: Liliam Madera  YOB: 1995  MRN: [id number]    Date of Procedure: 7/26/2021    Pre-Op Diagnosis: LISFRANC INJURY RIGHT FOOT    Post-Op Diagnosis: Same       Procedure(s):  LISFRANC ARTHRODESIS, SECOND TMTJ ARTHRODESIS, ORIF METATARSALS 3, 4,  APPLICATION OF BELOW KNEE SPLINT, RIGHT FOOT    Surgeon(s):  Homar Raman DPM    Assistant:  Resident: Mika Ga DPM, Simi Orourke DPM  Student: Lenin Sims MS4    Anesthesia: General anesthesia with popliteal nerve block right lower extremity    Injectables: Preop 10 mL 0.5% Marcaine with epinephrine, postop 20 mL 0.5% Marcaine plain    Hemostasis: Right pneumatic calf tourniquet, 250 mmHg - 120 minutes    Materials: 3-0 Vicryl, 4-0 Vicryl, 3-0 Nylon    Estimated Blood Loss: 50    Complications: None      Specimens:   * No specimens in log *    Implants:  Liliana/Brooks:  -Lapidus [place]2 plate  -Lisfranc plate  -4.1 x 40 mm lag screw x1  -3.6 x 26 mm lag screw x1  -3.5 x 20 mm locking screw x1  -3.5 x 18 mm locking screws x2  -3.5 x 12 mm locking screw x1  -2.7 x 18 mm locking screw x1  -2.7 x 16 mm locking screw x2  -2.7 x 14 mm locking screw x1  -2.0 x 18 mm locking screw x4  -2.0 x 16 mm locking screw x3  -2.0 x 14 mm locking screw x1  -2.0 x 12 mm locking screw x1  Mini frag plates  -5 hole straight plate x1  -5 hole T plate x1  -Augment injectable kit 3 mL x2  -Vitoss        Implant Name Type Inv. Item Serial No.  Lot No. LRB No. Used Action   K WIRE FIX L6IN DIA0.062IN [[id number]] [facility] [employer]]  K WIRE FIX L6IN DIA0.062IN [[id number]] [[employer]]  [facility] [employer] [facility] [employer]  Right 2 Explanted           Drains: * No LDAs found *    Findings: See op report    INDICATIONS FOR PROCEDURE: Patient recently had a syncopal event on the night of 7/26/2021 and woke up having significant pain to the right lower extremity.  She tried to ambulate to get back to bed but the pain was so intense that she went to the emergency department for further work-up and evaluation. X-ray and CT imaging revealed Lisfranc ligament avulsion fracture as well as avulsion base fracture to the intermediate cuneiform and transverse fractures to the third and fourth metatarsal base with lateral displacement. This patient has signs and symptoms clinically  consistent with the above mentioned preoperative diagnosis. Having failed conservative treatment, it was determined that the patient would benefit from surgical intervention. All potential risks, benefits, and complications were discussed with the patient prior to the scheduling of surgery. All the patient's questions were answered and no guarantees were given. The patient wished to proceed with surgery, and informed written consent was obtained. Detail of Procedure: Prior to the patient coming into the operating area she received a popliteal block to the right lower extremity. The patient was brought from the preoperative area and placed on the operating table in the supine position. Following induction of general anesthesia a local block consisting of a total of 10 mL of 0.5% Marcaine with epi to anesthetize the patients surgical incision lines. Next, applied a well padded pneumatic calf tourniquet to the patients right. The patients right lower extremity was then scrubbed, prepped and draped in the usual sterile manner. A time-out was performed. The patient, procedure and operative site were confirmed and the following procedure was then performed. The right lower extremity was exsanguinated using an esmark bandage and the right pneumatic calf tourniquet was inflated to 250 mmHg. Detail of procedure #1 Lisfranc arthrodesis and 2nd TMTJ arthrodesis right foot: At this time attention was directed towards the medial side of the right mid foot.  Using a #15 blade a full thickness 7 cm linear incision was made along the intermediate were completely mobilized in addition to their respective metatarsal.  While inspecting the lateral cuneiform is noted that it was still adhered well, nonmobile and was stable.     Next, using the [place]/Lively joint prep kit the second intercuneiform joint and base of the proximal base of the second metatarsal articular surfaces were removed in toto to expose the subchondral plate. Next, the subchondral plates were fenestrated using a combination of drilling and fish scaling to encourage/promote vascular and osseous ingrowth within the fusion site. Vitoss allograft was then implanted into the intercuneiform joints to encourage osseous bridging. Using a bone reduction tenaculum, the three cuneiform bones were reduced and compressed from medial to lateral. Next, the guide wire for the [employer]nis set was driven from medial to lateral through the first and second cuneiforms. Intraoperative fluoro was used to confirm placement of the wire. Next, a 3.6 x 26 mm fully threaded screw was inserted per manufacture's technique from medial to lateral with excellent compression noted. Again, placement of the hardware was confirmed using intraoperative fluoroscopy. Once the cuneiforms were internally fixated attention was directed towards the first [address] joint.      Next, using the sagittal saw with a #138 blade the articular surfaces of the first [address] joint was excised via planal resection and the medial cuneiform was remodeled back to normal anatomical contour. The first [address] joint was then feathered to allow excellent surface area contact in preparation for arthrodesis. Vitoss was implanted within the joint and the first metatarsal was then reduced onto its respective cuneiform and provisionally fixated with 0.062 K wire. The first [address] joint was fixated with a Liliana/Lively [place] 2 Lap[place] locking plate per manufacture's technique with a combination of locking and non-locking screws.       Positioning of the metatarsal right foot: Attention was then directed to the dorsal aspect of the right foot. Using intraoperative fluoroscopy to confirm the third interspace they will be used for our ORIF of the third and fourth metatarsals. Next, using a #15 blade a 7.0 full-thickness skin incision was made over the dorsal aspect of the third interspace down to and including subcutaneous tissue. Great care was taken to identify retract all neurovascular structures. All venous tributaries were electrocauterized as deemed necessary. Next, the incision was deepened using combination of blunt and sharp dissection down to exposure of the extensor digitorum brevis. At this time, the tendons of the extensor digitorum brevis were identified and bluntly dissected through until the underlying muscle belly was encountered. This muscle belly too was dissected through to the level of deep fascia using blunt dissection. Next, the third metatarsal was manually palpated to insure that the interspace was being avoided. Using a #15 blade, a deep fascial/capsular incision was made over the proximal aspect of the third metatarsal. The capsular tissue was then reflected using a key elevator and sharp dissection and the fracture line was identified. Fracture site had a oblique with jagged edge at the diaphysis metaphysis junction. Next, using, a freer elevator/hohmann retractor was used to locate the fracture using a combination of blunt dissection the fracture site was freshly prepped and removal of all commuted bones were removed in toto. After inspection of the fracture site no commuted bone was noted and the surgical site was irrigated with copious amounts normal sterile saline. Next, the fracture site was reduced the fracture back to anatomic alignment. After multiple attempts and readjustment the position of the bone was held in place using 0.045 K wire.   Next, live intraoperative fluoroscopy was utilized to visualize the reduction and noted excellent anatomical alignment. Next, a Liliana/[employer] mini frag 5 hole T locking plate was placed over the fracture site on the metatarsal.  Proper position of the plate was confirmed using live intraoperative fluoroscopy. After determining that the plate was in proper position, using modified AO technique and the manufacturers technique recommendations, the plate was permanently applied to the metatarsal over the fracture site. A combination of locking and non-locking screws were needed. Excellent compression was noted at the fracture site via direct visualization. The 0.045 K wire was removed in toto. Live fluoroscopy was used to confirm the position of the plate, and again excellent position and compression was noted. Please see detailed procedure #4 for skin closure. Detail of procedure #4 ORIF fourth metatarsal right foot: Attention was then directed to the dorsal aspect of the right foot. Using the same surgical incision for the ORIF to the third metatarsal on the right foot. The incision site was deepened using combination of sharp and blunt dissection down to the extensor digitorum brevis. Extensor digitorum brevis was identified bluntly transected through the underlying muscle belly. This muscle belly too was dissected through to the level of deep fascia using blunt dissection. Next, the fourth metatarsal was manually palpated to insure that the interspace was being avoided. Using a #15 blade, a deep fascial/capsular incision was made over the proximal aspect of the fourth metatarsal. The capsular tissue was then reflected using a key elevator, sharp dissection, and the fracture line was identified. It is noted at this time that the fourth metatarsal fracture site was right at the diaphysis metaphysis junction a transverse fracture that was laterally displaced. Next, using a freer elevator/Blaise and was used to locate, mobilize, and reduce the fracture back to anatomic alignment.  This position was held in place using 0.045 K wire. Next, live intraoperative fluoroscopy was used to visualize the reduction. This reduction was noted to be excellent. Next, a 5 hole straight locking plate was placed over the fracture site on the metatarsal.  Proper position of the plate was confirmed using live intraoperative fluoroscopy. After determining that the plate was in proper position, using modified AO technique and the manufacturers technique recommendations, the plate was permanently applied to the metatarsal over the fracture site. A combination of locking and non-locking screws were needed. Excellent compression was noted at the fracture site via direct visualization. The 0.045 K wire was removed in toto. Live fluoroscopy was used to confirm the position of the plate, and again excellent position and compression was noted. At this time, the wound was irrigated with copious amounts of normal saline. The tourniquet was deflated and all bleeders were identified and electrocauterized. The deep and capsular layers were reapproximated using 3-0 Vicryl. Next, the subcutaneous tissue was reapproximated using 4-0 Vicryl.,  The skin edges were reapproximated using 3-0 nylon and a simple interrupted suture technique. Detail of procedure #5 application of below-knee splint right lower extremity: At this time, a local anesthetic was injected in a regional block fashion about the patients surgical sites for the patients postoperative comfort and soft dry sterile dressing was applied. The pneumatic calf tourniquet was rapidly deflated, after a total time of 120 minutes, and a prompt instantaneous hyperemic response was noted on all aspects of the patients right lower extremity. Next, copious amounts of cast padding was applied to the patient's right lower extremity from the metatarsal heads to approximately 3 finger breaths distal to the head and neck of the fibula.  Next, using moistened padded splint material, a posterior splint was applied from the plantar foot posteriorly up the leg to approximately the midcalf area. ACE compression was then applied from distal to proximal to secure the posterior splint in place and provide compression to prevent post-operative edema. At this time, the foot was then dorsiflexed to 90 degrees to prevent acquired equinus deformity and relieve tension on the surgical repair. End of Procedure: The patient tolerated the procedure and anesthesia well. The patient left the OR with vital signs stable and vascular status intact to all remaining digits of the right foot. Following a period of post-operative monitoring, the patient will be sent back to our in-house room. The patient is to keep the dressing clean, dry, and intact at all times.  Procedure was definitive.  Patient is strict nonweightbearing to the right lower extremity and full weightbearing to left lower extremity.  Will follow-up on PT/OT recommendation.  Patient okay for discharge from podiatry standpoint pending medical clearance.  Patient can follow-up at her first outpatient appointment in [place], [place] with Dr. Silvina Manuel.        Dictated on behalf of Chris Mireles 64  Podiatry resident, PGY 3  Perfect serve      Electronically signed by Maria L Galvan DPM on 7/26/2021 at 7:47 PM

## 2021-07-26 NOTE — PROGRESS NOTES
Patient transported to OR, consent in chart.  Ancef being sent to OR from pharmacy, will cont to monitor

## 2021-07-26 NOTE — BRIEF OP NOTE
nonweightbearing to the right lower extremity and full weightbearing to left lower extremity. Will follow-up on PT/OT recommendation. Patient okay for discharge from podiatry standpoint pending medical clearance. Patient can follow-up at her first outpatient appointment in [place], [place] with Dr. Maria Del Carmen Chu.       Electronically signed by Batool Haas DPM on 7/26/2021 at 7:35 PM

## 2021-07-26 NOTE — ED TRIAGE NOTES
Pt states she was getting out the shower when she had an episode where she almost passed out. Pt states this has happened 1 other time. Tonight she fell landing on her left foot. Pt has obvious swelling to foot but able to wiggle toes to command.

## 2021-07-26 NOTE — CONSULTS
Department of Podiatry Consult Note  Resident       Reason for Consult: Lisfranc injury, right foot  Requesting Physician: Najma Montez MD    CHIEF COMPLAINT: Right foot injury    HISTORY OF PRESENT ILLNESS:                The patient is a 31 y.o. female with significant past medical history as listed below. Podiatry was consulted for Lisfranc injury, right foot. Patient reports he had a syncopal episode this evening, tried getting to her bed, but fell before she could make it to her bed. She reports she had significant pain at the time of the fall but no bruising or swelling. She reports shortly after the fall the swelling and bruising began. She reports pain is 8/10 and sharp to the right foot and is unable to bear weight on the right foot. Reports she has only had one other syncopal episode and it was many years ago. She denies blood thinner use. Denies illicit drug use. Reports she is on birth control, currently on last day of her period. Patient denies fever, chills, nausea, vomiting, shortness of breath, chest pain. Patient has no other pedal complaints at this time. Past Medical History:    History reviewed. No pertinent past medical history. Past Surgical History:        Procedure Laterality Date    TONSILLECTOMY         Allergies:   Patient has no known allergies. Medications:   Home Meds  No current facility-administered medications on file prior to encounter. Current Outpatient Medications on File Prior to Encounter   Medication Sig Dispense Refill    norgestimate-ethinyl estradiol (ESTARYLLA) 0.25-35 MG-MCG per tablet Take 1 tablet by mouth daily         Current Meds  No current facility-administered medications for this encounter. Family History:   History reviewed. No pertinent family history. Social History:   TOBACCO:   reports that she has never smoked. She has never used smokeless tobacco.  ETOH:   reports current alcohol use.   DRUGS:   reports no history of drug use. REVIEW OF SYSTEMS:    As above. PHYSICAL EXAM:      Vitals:    /70   Pulse 71   Temp 96.8 °F (36 °C) (Axillary)   Resp 17   Ht 5' 11\" (1.803 m)   Wt 190 lb (86.2 kg)   LMP 07/23/2021   SpO2 98%   BMI 26.50 kg/m²     LABS:   Recent Labs     07/25/21  2337   WBC 13.5*   HGB 13.9   HCT 40.3        Recent Labs     07/25/21  2337      K 3.8      CO2 26   BUN 12   CREATININE 0.9     No results for input(s): PROT, INR, APTT in the last 72 hours. VASCULAR: DP nonpalpable right foot 2/2 edema. DP left foot and PT bilateral palpable 2/4. Upon hand-held Doppler examination, DP and PT signals triphasic bilateral.  CFT is brisk to the digits of the foot b/l. Skin temperature is warm to cool from proximal to distal with no focal calor noted. Moderate edema noted right foot. No pain with calf compression b/l. NEUROLOGIC: Gross and epicritic sensation is intact and equal b/l. Protective sensation is intact and equal at all pedal sites b/l. DERMATOLOGIC: Ecchymosis noted dorsal aspect right foot. No open wounds noted bilateral.  Scant fluctuance noted dorsal right foot 2/2 edema. No erythema or crepitus noted. Patient provided verbal consent for today's photo. MUSCULOSKELETAL: Muscle strength is 3/5 for all pedal groups tested right foot, 5/5 left foot. Significant tenderness with palpation globally of right foot with maximal pain at Lisfranc ligament and third and fourth metatarsal shafts proximally. Ankle joint ROM is decreased in dorsiflexion with the knee extended.     IMAGING:  XR Foot right 7/25/2021  Narrative       Patient: Stelring Zamarripa Out: 00:40   Exam(s): FILM RIGHT FOOT        EXAM:     XR Right Foot Complete, 3 or More Views       CLINICAL HISTORY:     pain after fall.       TECHNIQUE:     Frontal, lateral and oblique views of the right foot.       COMPARISON:     No relevant prior studies available.       FINDINGS:   Bones/joints:  Transverse fractures of the third and fourth metatarsal    bases with lateral displacement of the distal portions.  No obvious intra-   articular extension.     Small bone fragment along the medial aspect of the first tarsometatarsal    joint.     Small hazy triangular opacity just medial to the base of the second    metatarsal.  Likely reflects Lisfranc ligament avulsion fracture.  On the    lateral view, there is step-off at the tarsometatarsal joints.       Soft tissues:  Dorsal foot soft tissue swelling.  No radiopaque foreign    body.           Electronically signed by Henrik Castaneda M.D. on 07-26-21 at 0040       Impression   1.  Transverse fractures of the third and fourth metatarsal bases with    lateral displacement of the distal portions.     2.  Small bone fragment along the medial aspect of the first    tarsometatarsal joint. 3.  Small hazy triangular opacity just medial to the base of the second    metatarsal.  Likely reflects Lisfranc ligament avulsion fracture. 4.  Consider CT/MR to further evaluate Lisfranc injury.           IMPRESSION/RECOMMENDATIONS:      -Lisfranc injury, right foot  -Transverse fractures of third and fourth metatarsal bases, right foot  -Ambulatory dysfunction  -Foot pain, right    -Patient examined and evaluated at the bedside   -VSS. Leukocytosis noted (WBC 13.5). -hCG qualitative negative  -PT/INR ordered  -X-rays reviewed and noted above. -CT right foot ordered for surgical planning  -Chest x-ray ordered   -EKG from ED reviewed  -Closed reduction performed at the bedside. No complications. Patient tolerated without incident.  -Walker compression and posterior splint applied to right lower extremity  -Antibiotics preop  -Non-weightbearing right lower extremity  -Patient will require surgical intervention.   Plan for admit to medical team and surgical intervention this afternoon pending medical clearance per medicine team.  -Will obtain consent following CT results.  -N.p.o. at 0500  -Hold anticoagulants      DISPO: Lisfranc injury, fractures of third and fourth metatarsal bases, right foot. CT right foot ordered for surgical planning. Patient will require surgical intervention, planned for this afternoon pending medical clearance.     - The patient will be staffed with Dr. Zaina Allen DPM.    Joss Sánchez DPM  07/26/21  1:22 AM

## 2021-07-26 NOTE — ED PROVIDER NOTES
[address]          ATTENDING PHYSICIAN NOTE       Date of evaluation: 7/25/2021    Chief Complaint     Ankle Pain and Foot Pain      History of Present Illness     Rajesh Webster is a 31 y.o. female who presents with right foot pain after a syncopal episode that led to a fall and the injury of the right foot. She reports significant swelling of the foot and difficulty bearing weight. The patient in the past has had a couple of syncopal episodes but she believes that she is extremely dehydrated today and that is why it happened. She felt it coming on and try to get to the bed but in the process passed out and fell to the ground. She did not hurt anything else besides her right foot. She feels back to normal now. She denies any chest pain or palpitations. He does not complain of any headache or head injury. Review of Systems     Review of Systems   Constitutional: Negative for chills and fever. Respiratory: Negative for shortness of breath. Cardiovascular: Negative for chest pain and palpitations. Gastrointestinal: Negative for nausea and vomiting. Neurological: Positive for syncope. Negative for weakness, light-headedness and headaches. All other systems reviewed and are negative. Past Medical, Surgical, Family, and Social History     She has no past medical history on file. She has a past surgical history that includes Tonsillectomy. Her family history is not on file. She reports that she has never smoked. She has never used smokeless tobacco. She reports current alcohol use. She reports that she does not use drugs. Medications     Previous Medications    NORGESTIMATE-ETHINYL ESTRADIOL (ESTARYLLA) 0.25-35 MG-MCG PER TABLET    Take 1 tablet by mouth daily       Allergies     She has No Known Allergies.     Physical Exam     INITIAL VITALS: BP: 134/70, Temp: 96.8 °F (36 °C), Pulse: 71, Resp: 17, SpO2: 98 %   Physical Exam  Vitals and nursing note reviewed. Constitutional:       General: She is not in acute distress. Appearance: She is well-developed. She is not diaphoretic. HENT:      Head: Normocephalic and atraumatic. Eyes:      Extraocular Movements: Extraocular movements intact. Pupils: Pupils are equal, round, and reactive to light. Cardiovascular:      Rate and Rhythm: Normal rate and regular rhythm. Pulmonary:      Effort: Pulmonary effort is normal.   Musculoskeletal:      Right foot: Swelling, tenderness and bony tenderness present. Normal pulse. Left foot: Normal.   Skin:     General: Skin is warm and dry. Findings: Bruising present. Neurological:      Mental Status: She is alert and oriented to person, place, and time. Psychiatric:         Behavior: Behavior normal.         Diagnostic Results     EKG   Interpreted by Mary Anaya MD     Rhythm: normal sinus   Rate: normal  Axis: normal  Ectopy: none  Conduction: normal  ST Segments: no acute change  T Waves: no acute change  Q Waves: none    Clinical Impression: normal sinus rhythm with no acute changes/normal EKG      RADIOLOGY:  XR FOOT RIGHT (MIN 3 VIEWS)   Final Result   1. Transverse fractures of the third and fourth metatarsal bases with    lateral displacement of the distal portions. 2.  Small bone fragment along the medial aspect of the first    tarsometatarsal joint. 3.  Small hazy triangular opacity just medial to the base of the second    metatarsal.  Likely reflects Lisfranc ligament avulsion fracture. 4.  Consider CT/MR to further evaluate Lisfranc injury.           CT FOOT RIGHT WO CONTRAST    (Results Pending)   XR CHEST (2 VW)    (Results Pending)       LABS:   Results for orders placed or performed during the hospital encounter of 07/25/21   CBC Auto Differential   Result Value Ref Range    WBC 13.5 (H) 4.0 - 11.0 K/uL    RBC 4.77 4.00 - 5.20 M/uL    Hemoglobin 13.9 12.0 - 16.0 g/dL    Hematocrit 40.3 36.0 - 48.0 %    MCV 84.3 80.0 - 100.0 fL    MCH 29.0 26.0 - 34.0 pg    MCHC 34.4 31.0 - 36.0 g/dL    RDW 14.4 12.4 - 15.4 %    Platelets 829 597 - 779 K/uL    MPV 8.6 5.0 - 10.5 fL    Neutrophils % 80.1 %    Lymphocytes % 12.5 %    Monocytes % 6.4 %    Eosinophils % 0.8 %    Basophils % 0.2 %    Neutrophils Absolute 10.9 (H) 1.7 - 7.7 K/uL    Lymphocytes Absolute 1.7 1.0 - 5.1 K/uL    Monocytes Absolute 0.9 0.0 - 1.3 K/uL    Eosinophils Absolute 0.1 0.0 - 0.6 K/uL    Basophils Absolute 0.0 0.0 - 0.2 K/uL   Basic Metabolic Panel w/ Reflex to MG   Result Value Ref Range    Sodium 140 136 - 145 mmol/L    Potassium reflex Magnesium 3.8 3.5 - 5.1 mmol/L    Chloride 104 99 - 110 mmol/L    CO2 26 21 - 32 mmol/L    Anion Gap 10 3 - 16    Glucose 96 70 - 99 mg/dL    BUN 12 7 - 20 mg/dL    CREATININE 0.9 0.6 - 1.1 mg/dL    GFR Non-African American >60 >60    GFR African American >60 >60    Calcium 9.6 8.3 - 10.6 mg/dL   HCG Qualitative, Serum   Result Value Ref Range    hCG Qual Negative Detects HCG level >10 MIU/mL       RECENT VITALS:  BP: 134/70,Temp: 96.8 °F (36 °C), Pulse: 71, Resp: 17, SpO2: 98 %       ED Course     Nursing Notes, Past Medical Hx, Past Surgical Hx, Social Hx,Allergies, and Family Hx were reviewed. patient was given the following medications:  Orders Placed This Encounter   Medications    0.9 % sodium chloride bolus    morphine (PF) injection 2 mg    OR Linked Order Group     oxyCODONE-acetaminophen (PERCOCET) 5-325 MG per tablet 1 tablet     oxyCODONE-acetaminophen (PERCOCET) 5-325 MG per tablet 2 tablet       CONSULTS:  IP CONSULT TO PODIATRY  IP CONSULT TO HOSPITALIST    MEDICAL DECISIONMAKING / ASSESSMENT / Alex Boles is a 31 y.o. female presenting with a right foot injury that resulted from a syncopal episode. She was found to have third and fourth metatarsal fractures. Labs were sent for evaluation of syncope and these were unremarkable. EKG was normal as well.   Orthostatics trended toward positive and therefore she was given IV fluids. The fractures in the foot will need surgical repair and the patient was seen by podiatry who is covering foot and ankle trauma today and the patient is choosing at this point to move forward with surgery later on today. She will be admitted to the hospital in preparation for surgery. Clinical Impression     1. Closed fracture of right foot, initial encounter    2.  Syncope and collapse        Disposition     DISPOSITION Decision To Admit 07/26/2021 03:07:19 AM       Jose Sr MD  07/26/21 2928

## 2021-07-26 NOTE — CARE COORDINATION
CM following, pt from home with family support, plan OR today at 3 pm with pod service. Ehsan need PT/OT evals post of for DC recs DME needs.   Electronically signed by Krissy Parisi RN on 7/26/2021 at 10:11 [phone]

## 2021-07-26 NOTE — H&P
Hospital Medicine History & Physical      Date of Admission: 7/25/2021    Date of Service: Pt seen/examined on 7/26/2021 and Admitted to Inpatient with expected LOS greater than two midnights due to medical therapy for syncope resulting in fall and right foot fracture at high risk for complications. Chief Complaint:    Rt foot pain   Passed out    History Of Present Illness:     31 y.o. female w/ no known pmh except a tonsillectomy at age 5 and wisdom teeth removal who presents after passing out at home. Pt reports being at normal baseline state of health yesterday and was doing laundry and was up and down a lot when she started to feel hot and then felt she would pass out. She did pass out and awkoe on the floor w/ pain in her right foot. SHe reports falling towrads her pillows on the carpet and did not hit her head. She came in the ed where imaging showed right foot fractures. She denies cp, sob, palpitations, dizziness, seizue like activity preceding the episode. She reports a similar epsiode in her childhood while in the shower, falling in her tub. Past Medical History:      History reviewed. No pertinent past medical history. Past Surgical History:          Procedure Laterality Date    TONSILLECTOMY         Medications Prior to Admission:      Prior to Admission medications    Medication Sig Start Date End Date Taking? Authorizing Provider   norgestimate-ethinyl estradiol (ESTARYLLA) 0.25-35 MG-MCG per tablet Take 1 tablet by mouth daily   Yes Historical Provider, MD       Allergies:  Patient has no known allergies. Social History:      WOrks as a dentis    TOBACCO:   reports that she has never smoked. She has never used smokeless tobacco.  ETOH:   reports current alcohol use.   E-Cigarettes/Vaping Use     Questions Responses    E-Cigarette/Vaping Use     Start Date     Passive Exposure     Quit Date     Counseling Given     Comments         Family History:      + CAD in father s/p cabg at 72  No family hx of strokes/ sudden death    REVIEW OF SYSTEMS COMPLETED:     Review of Systems   Constitutional: Negative for activity change, appetite change, chills and diaphoresis. HENT: Negative for congestion and sore throat. Respiratory: Negative for cough, choking, chest tightness and shortness of breath. Cardiovascular: Negative for chest pain, palpitations and leg swelling. Gastrointestinal: Negative for abdominal pain, blood in stool, diarrhea, nausea and vomiting. Genitourinary: Negative for difficulty urinating, dysuria and flank pain. Musculoskeletal: Negative for myalgias, neck pain and neck stiffness. Skin: Negative for color change, pallor and rash. Neurological: Negative for dizziness, numbness and headaches. Psychiatric/Behavioral: Negative for behavioral problems, confusion and hallucinations. The patient is not nervous/anxious. PHYSICAL EXAM PERFORMED:    /70   Pulse 78   Temp 97.9 °F (36.6 °C) (Oral)   Resp 16   Ht 5' 11\" (1.803 m)   Wt 190 lb (86.2 kg)   LMP 07/23/2021   SpO2 97%   BMI 26.50 kg/m²     General appearance:  No apparent distress, appears stated age and cooperative. HEENT:  Normal cephalic, atraumatic without obvious deformity. Extra ocular muscles intact. Conjunctivae/corneas clear. Neck: Supple, with full range of motion. No jugular venous distention. Trachea midline. Respiratory:  Normal respiratory effort. Clear to auscultation, bilaterally without Rales/Wheezes/Rhonchi. Cardiovascular:  Regular rate and rhythm with normal S1/S2 without murmurs, rubs or gallops. Abdomen: Soft, non-tender, non-distended with normal bowel sounds. Musculoskeletal:  Rt foot in dressing, no edema left le  Skin: Skin color, texture, turgor normal.  No rashes or lesions. Neurologic:  Neurovascularly intact without any focal sensory/motor deficits.  Cranial nerves: II-XII intact, grossly non-focal.  Psychiatric:  Alert and oriented, thought content appropriate, normal insight  Capillary Refill: Brisk,3 seconds, normal  Peripheral Pulses: +2 palpable, equal bilaterally       Labs:     Recent Labs     07/25/21 2337   WBC 13.5*   HGB 13.9   HCT 40.3        Recent Labs     07/25/21 2337      K 3.8      CO2 26   BUN 12   CREATININE 0.9   CALCIUM 9.6     No results for input(s): AST, ALT, BILIDIR, BILITOT, ALKPHOS in the last 72 hours. Recent Labs     07/25/21 2337   INR 0.95     No results for input(s): Bridgett Ting in the last 72 hours. Urinalysis:    No results found for: Bonna Greet, BACTERIA, RBCUA, BLOODU, [address], [address] [place] [address]    Radiology:     EKG:  I have reviewed the EKG with the following interpretation: nsr, nml axis, ? Right atrial enlargement, no acute sttw changes noted    CT FOOT RIGHT WO CONTRAST   Final Result   Lisfranc fracture dislocation, as described. This consists of a comminuted fracture at the plantar base of the middle    cuneiform as well as widening of the Lisfranc articulation. Fractures of the third and fourth metatarsals. Dislocation of the cuboid-fourth of the cuboid-fifth articulations. XR CHEST (2 VW)   Final Result     No infiltrate. XR FOOT RIGHT (MIN 3 VIEWS)   Final Result   1. Transverse fractures of the third and fourth metatarsal bases with    lateral displacement of the distal portions. 2.  Small bone fragment along the medial aspect of the first    tarsometatarsal joint. 3.  Small hazy triangular opacity just medial to the base of the second    metatarsal.  Likely reflects Lisfranc ligament avulsion fracture. 4.  Consider CT/MR to further evaluate Lisfranc injury. ASSESSMENT and PLAN:  Syncope and collapse:  Possibly vasovagal vs orthostatic. Is active on a daily basis where she works out, walks 3 miles daily and bikes w/o any sx. Given 2nd episode in her life will exclude structural heart disease- obtain echo w/ bubble.     Rt ankle fracture:  2/2 fall. Podiatry consulted. Plan is for surgery today. From a medical standpoint pt is low cardiovascular risk for surgery. Recommend proceeding w/ surgery w/o awaiting further cardiac w/u.     DVT Prophylaxis: Post op lovenox  Diet: Diet NPO Exceptions are: Sips of Water with Meds  Code Status: Full Code    PT/OT Eval Status: Consult after surgery    Nimco Lee MD

## 2021-07-26 NOTE — ANESTHESIA PRE PROCEDURE
Department of Anesthesiology  Preprocedure Note       Name:  Beka Millre   Age:  31 y.o.  :  1995                                          MRN:  [id number]         Date:  2021      Surgeon: Bronson Whittington):  Kiel Jason DPM    Procedure: Procedure(s):  OPEN REDUCTION INTERNAL FIXATION VS ARTHRODESIS RIGHT FOOD    Medications prior to admission:   Prior to Admission medications    Medication Sig Start Date End Date Taking?  Authorizing Provider   norgestimate-ethinyl estradiol (ESTARYLLA) 0.25-35 MG-MCG per tablet Take 1 tablet by mouth daily   Yes Historical Provider, MD       Current medications:    Current Facility-Administered Medications   Medication Dose Route Frequency Provider Last Rate Last Admin    oxyCODONE-acetaminophen (PERCOCET) 5-325 MG per tablet 1 tablet  1 tablet Oral Q4H PRN Frenie Tran DPM   1 tablet at 21 0440    Or    oxyCODONE-acetaminophen (PERCOCET) 5-325 MG per tablet 2 tablet  2 tablet Oral Q4H PRN Fernie Tran DPM   2 tablet at 21 0920    sodium chloride flush 0.9 % injection 5-40 mL  5-40 mL Intravenous 2 times per day Ambika Chau MD        sodium chloride flush 0.9 % injection 5-40 mL  5-40 mL Intravenous PRN Ambika Chau MD        0.9 % sodium chloride infusion  25 mL Intravenous PRN Ambika Chau MD        ondansetron (ZOFRAN-ODT) disintegrating tablet 4 mg  4 mg Oral Q8H PRN Ambika Chau MD        Or    ondansetron (ZOFRAN) injection 4 mg  4 mg Intravenous Q6H PRN Ambika Chau MD        polyethylene glycol (GLYCOLAX) packet 17 g  17 g Oral Daily PRN Ambika Chau MD        acetaminophen (TYLENOL) tablet 650 mg  650 mg Oral Q6H PRN Ambika Chau MD        Or    acetaminophen (TYLENOL) suppository 650 mg  650 mg Rectal Q6H PRN Ambika Chau MD        lactated ringers infusion   Intravenous Continuous Ambika Chau  mL/hr at 21 0432 New Bag at 07/26/21 0432    morphine (PF) injection 2 mg  2 mg Intravenous Q3H PRN Dutch Mendoza MD   2 mg at 07/26/21 1154    ceFAZolin (ANCEF) 2,000 mg in dextrose 5 % 100 mL IVPB  2,000 mg Intravenous On Call to [address], DPM        perflutren lipid microspheres (DEFINITY) injection 1.65 mg  1.5 mL Intravenous ONCE PRN Nereida Mckinney MD           Allergies:  No Known Allergies    Problem List:    Patient Active Problem List   Diagnosis Code    Ankle pain, right M25.571       Past Medical History:  History reviewed. No pertinent past medical history. Past Surgical History:        Procedure Laterality Date    TONSILLECTOMY         Social History:    Social History     Tobacco Use    Smoking status: Never Smoker    Smokeless tobacco: Never Used   Substance Use Topics    Alcohol use: Yes     Comment: social                                Counseling given: Not Answered      Vital Signs (Current):   Vitals:    07/26/21 0412 07/26/21 0728 07/26/21 1048 07/26/21 1151   BP: 123/83 125/70 131/70 135/77   Pulse: 75 78 79 71   Resp: 16 16 16 16   Temp: 98.3 °F (36.8 °C) 97.9 °F (36.6 °C) 98 °F (36.7 °C) 97.8 °F (36.6 °C)   TempSrc: Oral Oral Oral Oral   SpO2: 98% 97% 100% 99%   Weight:       Height:                                                  BP Readings from Last 3 Encounters:   07/26/21 135/77       NPO Status:                                                                                 BMI:   Wt Readings from Last 3 Encounters:   07/25/21 190 lb (86.2 kg)     Body mass index is 26.5 kg/m².     CBC:   Lab Results   Component Value Date    WBC 13.5 07/25/2021    RBC 4.77 07/25/2021    HGB 13.9 07/25/2021    HCT 40.3 07/25/2021    MCV 84.3 07/25/2021    RDW 14.4 07/25/2021     07/25/2021       CMP:   Lab Results   Component Value Date     07/25/2021    K 3.8 07/25/2021     07/25/2021    CO2 26 07/25/2021    BUN 12 07/25/2021    CREATININE 0.9 07/25/2021    GFRAA >60 07/25/2021    LABGLOM >60 07/25/2021    GLUCOSE 96 07/25/2021    CALCIUM 9.6 07/25/2021       POC Tests: No results for input(s): POCGLU, POCNA, POCK, POCCL, POCBUN, POCHEMO, POCHCT in the last 72 hours. Coags:   Lab Results   Component Value Date    PROTIME 10.7 07/25/2021    INR 0.95 07/25/2021       HCG (If Applicable): No results found for: PREGTESTUR, PREGSERUM, HCG, HCGQUANT     ABGs: No results found for: PHART, PO2ART, LRP4ZUJ, CQH0SMG, BEART, S7UDNOQG     Type & Screen (If Applicable):  No results found for: LABABO, LABRH    Drug/Infectious Status (If Applicable):  No results found for: HIV, HEPCAB    COVID-19 Screening (If Applicable): No results found for: COVID19        Anesthesia Evaluation  Patient summary reviewed and Nursing notes reviewed no history of anesthetic complications:   Airway: Mallampati: II  TM distance: >3 FB   Neck ROM: full  Mouth opening: > = 3 FB Dental:          Pulmonary:Negative Pulmonary ROS                              Cardiovascular:Negative CV ROS                      Neuro/Psych:   Negative Neuro/Psych ROS              GI/Hepatic/Renal: Neg GI/Hepatic/Renal ROS            Endo/Other: Negative Endo/Other ROS                    Abdominal:             Vascular: negative vascular ROS. Other Findings:             Anesthesia Plan      general     ASA 3    (65-year-old female presents for OPEN REDUCTION INTERNAL FIXATION VS ARTHRODESIS RIGHT FOOT. Plan general anesthesia with ASA standard monitors; popliteal fossa sciatic nerve block for postoperative pain control if desired by both patient and attending surgeon. Questions answered. Patient agreeable with anesthetic plan.  )  Induction: intravenous. Anesthetic plan and risks discussed with patient. Plan discussed with CRNA.     Attending anesthesiologist reviewed and agrees with Carina Canales MD   7/26/2021

## 2021-07-26 NOTE — PROGRESS NOTES
disintegrating tablet 4 mg  4 mg Oral Q8H PRN Maria R Norman MD        Or    ondansetron (ZOFRAN) injection 4 mg  4 mg Intravenous Q6H PRN Maria R Norman MD        polyethylene glycol (GLYCOLAX) packet 17 g  17 g Oral Daily PRN Maria R Norman MD        acetaminophen (TYLENOL) tablet 650 mg  650 mg Oral Q6H PRN Maria R Norman MD        Or    acetaminophen (TYLENOL) suppository 650 mg  650 mg Rectal Q6H PRN Maria R Norman MD        lactated ringers infusion   Intravenous Continuous Maria R Norman  mL/hr at 07/26/21 0432 New Bag at 07/26/21 0432    morphine (PF) injection 2 mg  2 mg Intravenous Q3H PRN Maria R Norman MD   2 mg at 07/26/21 0550    ceFAZolin (ANCEF) 2,000 mg in dextrose 5 % 100 mL IVPB  2,000 mg Intravenous On Call to Romi [address], DPM           Diet: Diet NPO Exceptions are: Sips of Water with Meds    CXR: normal      EKG: normal sinus rhythm, no blocks or conduction defects, no ischemic changes     Echocardiogram: not done    IV access/ saline lock: Yes    PT/INR: 10.7/0.95    Type and Screen: N/A    Pregnancy test: hCG negative    COVID-19: Vaccinated, received 2nd dose in January 2021    Known risk factors for perioperative complications: None      Anesthesia to see patient.     Lurdes Araiza DPM  07/26/21  9:40 AM

## 2021-07-27 VITALS
SYSTOLIC BLOOD PRESSURE: 109 MMHG | WEIGHT: 190 LBS | BODY MASS INDEX: 26.6 KG/M2 | HEIGHT: 71 IN | DIASTOLIC BLOOD PRESSURE: 65 MMHG | RESPIRATION RATE: 16 BRPM | HEART RATE: 99 BPM | OXYGEN SATURATION: 96 % | TEMPERATURE: 99.9 F

## 2021-07-27 LAB
ANION GAP SERPL CALCULATED.3IONS-SCNC: 10 MMOL/L (ref 3–16)
BASOPHILS ABSOLUTE: 0 K/UL (ref 0–0.2)
BASOPHILS RELATIVE PERCENT: 0.4 %
BUN BLDV-MCNC: 5 MG/DL (ref 7–20)
CALCIUM SERPL-MCNC: 9.1 MG/DL (ref 8.3–10.6)
CHLORIDE BLD-SCNC: 101 MMOL/L (ref 99–110)
CO2: 25 MMOL/L (ref 21–32)
CREAT SERPL-MCNC: 0.8 MG/DL (ref 0.6–1.1)
EOSINOPHILS ABSOLUTE: 0.1 K/UL (ref 0–0.6)
EOSINOPHILS RELATIVE PERCENT: 1 %
GFR AFRICAN AMERICAN: >60
GFR NON-AFRICAN AMERICAN: >60
GLUCOSE BLD-MCNC: 109 MG/DL (ref 70–99)
HCT VFR BLD CALC: 37.9 % (ref 36–48)
HEMOGLOBIN: 13 G/DL (ref 12–16)
LV EF: 53 %
LVEF MODALITY: NORMAL
LYMPHOCYTES ABSOLUTE: 2 K/UL (ref 1–5.1)
LYMPHOCYTES RELATIVE PERCENT: 18.6 %
MCH RBC QN AUTO: 29.4 PG (ref 26–34)
MCHC RBC AUTO-ENTMCNC: 34.5 G/DL (ref 31–36)
MCV RBC AUTO: 85.3 FL (ref 80–100)
MONOCYTES ABSOLUTE: 1.1 K/UL (ref 0–1.3)
MONOCYTES RELATIVE PERCENT: 10.9 %
NEUTROPHILS ABSOLUTE: 7.3 K/UL (ref 1.7–7.7)
NEUTROPHILS RELATIVE PERCENT: 69.1 %
PDW BLD-RTO: 13.9 % (ref 12.4–15.4)
PLATELET # BLD: 218 K/UL (ref 135–450)
PMV BLD AUTO: 8.5 FL (ref 5–10.5)
POTASSIUM REFLEX MAGNESIUM: 4.3 MMOL/L (ref 3.5–5.1)
RBC # BLD: 4.44 M/UL (ref 4–5.2)
SODIUM BLD-SCNC: 136 MMOL/L (ref 136–145)
WBC # BLD: 10.5 K/UL (ref 4–11)

## 2021-07-27 PROCEDURE — C8929 TTE W OR WO FOL WCON,DOPPLER: HCPCS

## 2021-07-27 PROCEDURE — 97530 THERAPEUTIC ACTIVITIES: CPT

## 2021-07-27 PROCEDURE — 97166 OT EVAL MOD COMPLEX 45 MIN: CPT

## 2021-07-27 PROCEDURE — 36415 COLL VENOUS BLD VENIPUNCTURE: CPT

## 2021-07-27 PROCEDURE — 97535 SELF CARE MNGMENT TRAINING: CPT

## 2021-07-27 PROCEDURE — 2580000003 HC RX 258: Performed by: PODIATRIST

## 2021-07-27 PROCEDURE — 6370000000 HC RX 637 (ALT 250 FOR IP): Performed by: PODIATRIST

## 2021-07-27 PROCEDURE — 97162 PT EVAL MOD COMPLEX 30 MIN: CPT

## 2021-07-27 PROCEDURE — 85025 COMPLETE CBC W/AUTO DIFF WBC: CPT

## 2021-07-27 PROCEDURE — 6360000002 HC RX W HCPCS: Performed by: PODIATRIST

## 2021-07-27 PROCEDURE — 97116 GAIT TRAINING THERAPY: CPT

## 2021-07-27 PROCEDURE — 80048 BASIC METABOLIC PNL TOTAL CA: CPT

## 2021-07-27 RX ORDER — MORPHINE SULFATE 2 MG/ML
4 INJECTION, SOLUTION INTRAMUSCULAR; INTRAVENOUS ONCE
Status: COMPLETED | OUTPATIENT
Start: 2021-07-27 | End: 2021-07-27

## 2021-07-27 RX ORDER — IBUPROFEN 200 MG
800 TABLET ORAL EVERY 8 HOURS
Status: DISCONTINUED | OUTPATIENT
Start: 2021-07-27 | End: 2021-07-27 | Stop reason: HOSPADM

## 2021-07-27 RX ORDER — IBUPROFEN 200 MG
800 TABLET ORAL EVERY 8 HOURS PRN
Status: DISCONTINUED | OUTPATIENT
Start: 2021-07-27 | End: 2021-07-27

## 2021-07-27 RX ORDER — GABAPENTIN 300 MG/1
300 CAPSULE ORAL 2 TIMES DAILY
Status: DISCONTINUED | OUTPATIENT
Start: 2021-07-27 | End: 2021-07-27 | Stop reason: HOSPADM

## 2021-07-27 RX ORDER — POLYETHYLENE GLYCOL 3350 17 G/17G
17 POWDER, FOR SOLUTION ORAL DAILY PRN
Qty: 527 G | Refills: 1 | Status: SHIPPED | OUTPATIENT
Start: 2021-07-27 | End: 2021-08-26

## 2021-07-27 RX ORDER — OXYCODONE AND ACETAMINOPHEN 10; 325 MG/1; MG/1
1 TABLET ORAL EVERY 4 HOURS PRN
Qty: 42 TABLET | Refills: 0 | Status: SHIPPED | OUTPATIENT
Start: 2021-07-27 | End: 2021-08-03

## 2021-07-27 RX ORDER — GABAPENTIN 300 MG/1
300 CAPSULE ORAL 2 TIMES DAILY
Qty: 30 CAPSULE | Refills: 0 | Status: SHIPPED | OUTPATIENT
Start: 2021-07-27 | End: 2021-07-27 | Stop reason: HOSPADM

## 2021-07-27 RX ORDER — OXYCODONE HYDROCHLORIDE AND ACETAMINOPHEN 5; 325 MG/1; MG/1
1 TABLET ORAL EVERY 6 HOURS PRN
Qty: 12 TABLET | Refills: 0 | Status: SHIPPED | OUTPATIENT
Start: 2021-07-27 | End: 2021-07-27 | Stop reason: HOSPADM

## 2021-07-27 RX ORDER — ONDANSETRON 4 MG/1
4 TABLET, ORALLY DISINTEGRATING ORAL EVERY 8 HOURS PRN
Qty: 21 TABLET | Refills: 0 | Status: SHIPPED | OUTPATIENT
Start: 2021-07-27 | End: 2021-08-03

## 2021-07-27 RX ORDER — GABAPENTIN 300 MG/1
300 CAPSULE ORAL NIGHTLY
Qty: 30 CAPSULE | Refills: 0 | Status: SHIPPED | OUTPATIENT
Start: 2021-07-27 | End: 2021-08-26

## 2021-07-27 RX ORDER — IBUPROFEN 800 MG/1
800 TABLET ORAL 2 TIMES DAILY PRN
Qty: 60 TABLET | Refills: 0 | Status: SHIPPED | OUTPATIENT
Start: 2021-07-27

## 2021-07-27 RX ADMIN — ACETAMINOPHEN 650 MG: 325 TABLET ORAL at 07:49

## 2021-07-27 RX ADMIN — OXYCODONE HYDROCHLORIDE AND ACETAMINOPHEN 1 TABLET: 5; 325 TABLET ORAL at 10:34

## 2021-07-27 RX ADMIN — ENOXAPARIN SODIUM 40 MG: 40 INJECTION SUBCUTANEOUS at 10:34

## 2021-07-27 RX ADMIN — Medication 10 ML: at 10:35

## 2021-07-27 RX ADMIN — OXYCODONE HYDROCHLORIDE AND ACETAMINOPHEN 2 TABLET: 5; 325 TABLET ORAL at 04:49

## 2021-07-27 RX ADMIN — MORPHINE SULFATE 4 MG: 2 INJECTION, SOLUTION INTRAMUSCULAR; INTRAVENOUS at 02:26

## 2021-07-27 RX ADMIN — IBUPROFEN 800 MG: 200 TABLET, FILM COATED ORAL at 07:48

## 2021-07-27 RX ADMIN — GABAPENTIN 300 MG: 300 CAPSULE ORAL at 07:49

## 2021-07-27 RX ADMIN — MORPHINE SULFATE 4 MG: 2 INJECTION, SOLUTION INTRAMUSCULAR; INTRAVENOUS at 05:57

## 2021-07-27 ASSESSMENT — PAIN SCALES - GENERAL
PAINLEVEL_OUTOF10: 10
PAINLEVEL_OUTOF10: 10
PAINLEVEL_OUTOF10: 9
PAINLEVEL_OUTOF10: 4
PAINLEVEL_OUTOF10: 7
PAINLEVEL_OUTOF10: 7

## 2021-07-27 ASSESSMENT — PAIN DESCRIPTION - LOCATION
LOCATION: FOOT
LOCATION: FOOT

## 2021-07-27 ASSESSMENT — PAIN DESCRIPTION - FREQUENCY
FREQUENCY: CONTINUOUS
FREQUENCY: CONTINUOUS

## 2021-07-27 ASSESSMENT — PAIN DESCRIPTION - PAIN TYPE
TYPE: SURGICAL PAIN
TYPE: SURGICAL PAIN

## 2021-07-27 ASSESSMENT — PAIN - FUNCTIONAL ASSESSMENT
PAIN_FUNCTIONAL_ASSESSMENT: ACTIVITIES ARE NOT PREVENTED
PAIN_FUNCTIONAL_ASSESSMENT: ACTIVITIES ARE NOT PREVENTED

## 2021-07-27 ASSESSMENT — PAIN DESCRIPTION - PROGRESSION
CLINICAL_PROGRESSION: NOT CHANGED
CLINICAL_PROGRESSION: NOT CHANGED

## 2021-07-27 ASSESSMENT — PAIN DESCRIPTION - ORIENTATION
ORIENTATION: RIGHT
ORIENTATION: RIGHT

## 2021-07-27 ASSESSMENT — PAIN DESCRIPTION - ONSET
ONSET: ON-GOING
ONSET: ON-GOING

## 2021-07-27 ASSESSMENT — PAIN DESCRIPTION - DESCRIPTORS
DESCRIPTORS: ACHING;CRUSHING;DISCOMFORT
DESCRIPTORS: ACHING;CONSTANT;CRAMPING

## 2021-07-27 NOTE — CARE COORDINATION
Case Management Assessment            Discharge Note                    Date / Time of Note: 7/27/2021 11:17 AM                  Discharge Note Completed by: Dennis Thompson RN    Patient Name: Monty Phan   YOB: 1995  Diagnosis: Ankle pain, right [M25.571]   Date / Time: 7/25/2021 10:39 PM    Current PCP: No primary care provider on file. Clinic patient: No    Hospitalization in the last 30 days: No    Advance Directives:  Code Status: Full Code  [place] DNR form completed and on chart: Not Indicated    Financial:  Payor: Diana Friend / Plan: [address] [place] [address] / Product Type: *No Product type* /      Pharmacy:    [address]  [address]  Phone: [phone] Fax: [phone]      Assistance purchasing medications?: Potential Assistance Purchasing Medications: No  Assistance provided by Case Management: None at this time    Does patient want to participate in local refill/ meds to beds program?: Yes    Meds To Beds General Rules:  1. Can ONLY be done Monday- Friday between 8:30am-5pm  2. Prescription(s) must be in pharmacy by 3pm to be filled same day  3. Copy of patient's insurance/ prescription drug card and patient face sheet must be sent along with the prescription(s)  4. Cost of Rx cannot be added to hospital bill. If financial assistance is needed, please contact unit  or ;  or  CANNOT provide pharmacy voucher for patients co-pays  5.  Patients can then  the prescription on their way out of the hospital at discharge, or pharmacy can deliver to the bedside if staff is available. (payment due at time of pick-up or delivery - cash, check, or card accepted)     Able to afford home medications/ co-pay costs: Yes    ADLS:  Current PT AM-PAC Score: 20 /24  Current OT AM-PAC Score: 21 /24      DISCHARGE Disposition: Home- No Services Needed    LOC at discharge: Not Applicable  OTTO Completed: Not Indicated    Notification completed in HENS/PAS?:  Not Applicable    IMM Completed:   Not Indicated    Transportation:  Transportation PLAN for discharge: family   Mode of Transport: [address] ordered at discharge: Not [address]: Not Applicable  Orders faxed: No    Durable Medical Equipment:  DME Provider: [employer] [facility]    Equipment obtained during hospitalization: walker    Home Oxygen and Respiratory Equipment:  Oxygen needed at discharge?: Not [address]: Not Applicable  Portable tank available for discharge?: Not Indicated    Dialysis:  Dialysis patient: No    Dialysis Center:  Not Applicable      Additional CM Notes: Pt from home with family will DC home with new Walker from [facility]. Family will drive home    The Plan for Transition of Care is related to the following treatment goals of Ankle pain, right [M25.571]    The Patient and/or patient representative Ruby Waters and her family were provided with a choice of provider and agrees with the discharge plan Yes    Freedom of choice list was provided with basic dialogue that supports the patient's individualized plan of care/goals and shares the quality data associated with the providers.  Not Indicated    Care Transitions patient: No    Isaiah Paniagua RN  [facility]  Case Management Department  Ph: [phone]  Fax: [phone]

## 2021-07-27 NOTE — PROGRESS NOTES
Podiatric Surgery Daily Progress Note  Romelia Reddy      Subjective :   Patient seen and examined this am at the bedside. Patient reports increased pain overnight, likely from preoperative block wearing off, but reports pain is managed at this time with oral analgesics. Patient denies N/V/F/C/SOB. Patient denies calf pain, thigh pain, chest pain. Review of Systems: A 12 point review of symptoms is unremarkable with the exception of the chief complaint. Patient specifically denies nausea, fever, vomiting, chills, shortness of breath, chest pain, abdominal pain, constipation or difficulty urinating. Objective     /65   Pulse 99   Temp 99.9 °F (37.7 °C) (Oral)   Resp 16   Ht 5' 11\" (1.803 m)   Wt 190 lb (86.2 kg)   LMP 07/23/2021   SpO2 96%   BMI 26.50 kg/m²      I/O:    Intake/Output Summary (Last 24 hours) at 7/27/2021 1155  Last data filed at 7/27/2021 1035  Gross per 24 hour   Intake 2195 ml   Output 250 ml   Net 1945 ml              Wt Readings from Last 3 Encounters:   07/25/21 190 lb (86.2 kg)       LABS:    Recent Labs     07/25/21  2337 07/27/21  0521   WBC 13.5* 10.5   HGB 13.9 13.0   HCT 40.3 37.9    218        Recent Labs     07/27/21  0521      K 4.3      CO2 25   BUN 5*   CREATININE 0.8        Recent Labs     07/25/21  2337   INR 0.95           LOWER EXTREMITY EXAMINATION    Dressing to right LE left clean, dry, and intact with posterior splint. No strikethrough noted to the external dressing.      CFT brisk to digits bilateral.  Patient able to perform active range of motion to digits bilateral.  No pain with calf compression bilateral.  Gross and epicritic sensation intact to digits bilateral.    IMAGING:  X-ray right foot 7/26/2021-postoperatively  FINDINGS:       No evidence of acute fracture or traumatic bony abnormality is seen.       Postoperative changes are noted from Lisfranc fracture repair with fixation plates noted on the first second third and

## 2021-07-27 NOTE — DISCHARGE SUMMARY
Hospital Medicine Discharge Summary    Patient ID: Bernabe Hsu      Patient's PCP: No primary care provider on file. Admit Date: 7/25/2021     Discharge Date:   7/27/2021    Admitting Physician: Colin Connelly MD     Discharge Physician: Brian Azevedo MD     Discharge Diagnoses and brief hospital course:  Rt Foot Fracture:  POD 1 s/p \"Lisfranc arthrodesis, second TMT J arthrodesis, ORIF metatarsals 3 & 4, application of below-knee splint, right foot 7/26/2021\"  Needs to be NWb per podiatry,. Pain meds/ surgical f/u and dvt proph w/ eliquis per Podiatry Surgery- appreciate involvement. Syncope:  Likely vasovagal.  Echo 7/27/2021:   Summary   Normal left ventricle size, wall thickness, and systolic function with an   estimated ejection fraction of 50-55%. No regional wall motion abnormalities   are seen. Diastolic filling parameters suggests normal diastolic function. A bubble study was performed and fails to show evidence of right to left   shunting. No significant valvular regurgitation or stenosis. Estimated pulmonary artery systolic pressure is 30 mmHg assuming a right   atrial pressure of 3 mmHg. Physical Exam Performed:     /65   Pulse 99   Temp 99.9 °F (37.7 °C) (Oral)   Resp 16   Ht 5' 11\" (1.803 m)   Wt 190 lb (86.2 kg)   LMP 07/23/2021   SpO2 96%   BMI 26.50 kg/m²       General appearance:  No apparent distress, appears stated age and cooperative. Neck: Supple, with full range of motion. Respiratory:  Normal respiratory effort. Clear to auscultation, bilaterally   Cardiovascular:  Regular rate and rhythm with normal S1/S2 without murmurs, rubs or gallops. Abdomen: Soft, non-tender, non-distended   Musculoskeletal:  Rt foot in dressing  Skin: Skin color, texture, turgor normal.  No rashes or lesions. Neurologic:  Neurovascularly intact without any focal sensory/motor deficits.  Cranial nerves: II-XII intact, grossly non-focal.  Psychiatric:  Alert and oriented, thought content appropriate, normal insight    Labs: For convenience and continuity at follow-up the following most recent labs are provided:    CBC:    Lab Results   Component Value Date    WBC 10.5 07/27/2021    HGB 13.0 07/27/2021    HCT 37.9 07/27/2021     07/27/2021       Renal:    Lab Results   Component Value Date     07/27/2021    K 4.3 07/27/2021     07/27/2021    CO2 25 07/27/2021    BUN 5 07/27/2021    CREATININE 0.8 07/27/2021    CALCIUM 9.1 07/27/2021         Significant Diagnostic Studies    Radiology:   XR FOOT RIGHT (MIN 3 VIEWS)   Final Result      Postoperative changes noted. CT FOOT RIGHT WO CONTRAST   Final Result   Lisfranc fracture dislocation, as described. This consists of a comminuted fracture at the plantar base of the middle    cuneiform as well as widening of the Lisfranc articulation. Fractures of the third and fourth metatarsals. Dislocation of the cuboid-fourth of the cuboid-fifth articulations. XR CHEST (2 VW)   Final Result     No infiltrate. XR FOOT RIGHT (MIN 3 VIEWS)   Final Result   1. Transverse fractures of the third and fourth metatarsal bases with    lateral displacement of the distal portions. 2.  Small bone fragment along the medial aspect of the first    tarsometatarsal joint. 3.  Small hazy triangular opacity just medial to the base of the second    metatarsal.  Likely reflects Lisfranc ligament avulsion fracture. 4.  Consider CT/MR to further evaluate Lisfranc injury.                  Consults:     IP CONSULT TO PODIATRY  IP CONSULT TO HOSPITALIST  IP CONSULT TO PODIATRY  IP CONSULT TO ANESTHESIOLOGY    Disposition:  Home     Condition at Discharge: Stable    Discharge Instructions/Follow-up:  W/ podiatry    Code Status:  Full Code     Activity: activity as tolerated    Diet: regular diet      Discharge Medications:     Current Discharge Medication List           Details   polyethylene glycol (GLYCOLAX) 17 g packet Take 17 g by mouth daily as needed for Constipation  Qty: 527 g, Refills: 1      gabapentin (NEURONTIN) 300 MG capsule Take 1 capsule by mouth 2 times daily for 15 days. Qty: 30 capsule, Refills: 0      oxyCODONE-acetaminophen (PERCOCET) 5-325 MG per tablet Take 1 tablet by mouth every 6 hours as needed for Pain for up to 3 days. Qty: 12 tablet, Refills: 0    Comments: Reduce doses taken as pain becomes manageable  Associated Diagnoses: Closed fracture of right foot, initial encounter              Details   norgestimate-ethinyl estradiol (ESTARYLLA) 0.25-35 MG-MCG per tablet Take 1 tablet by mouth daily           Time Spent on discharge is more than 31 in the examination, evaluation, counseling and review of medications and discharge plan.       Signed:    Kevin Burns MD   7/27/2021

## 2021-07-27 NOTE — DISCHARGE SUMMARY
Discharge Progress Note  7/27/2021 1:23 PM    Data:  Discharge order received. Action:  IV D/C'd without difficulty. See Doc Flowsheets for assessment. Patient given discharge instructions with prescriptions. Response:  Patient verbalized understanding of discharge instructions.  Patient left with all belongings to front lobby    Chanelle Albert RN  ________________________________________________________________________

## 2021-07-27 NOTE — PROGRESS NOTES
issued one pair std crutches. Treatment Diagnosis: mobility impairment due to fall  Decision Making: Medium Complexity  Patient Education: Pt educated on PT role, importance of OOB mobility, need to call for assist to get up, NWB R and she verbalized understanding. REQUIRES PT FOLLOW UP: Yes  Activity Tolerance  Activity Tolerance: Patient limited by endurance; Patient Tolerated treatment well (limited by NWB R)       Patient Diagnosis(es): The primary encounter diagnosis was Closed fracture of right foot, initial encounter. A diagnosis of Syncope and collapse was also pertinent to this visit. has no past medical history on file. has a past surgical history that includes Tonsillectomy and arthrodesis (Right, 7/26/2021). Restrictions  Position Activity Restriction  Other position/activity restrictions: strict NWB R LE     Vision/Hearing  Vision: Impaired  Vision Exceptions: Wears glasses at all times  Hearing: Within functional limits       Subjective  General  Chart Reviewed: Yes  Additional Pertinent Hx: 30 yo admitted 7/25/21 for  syncope/collapse/ankle pain. OR 7/26 LISFRANC ARTHRODESIS, ORIF METATARSALS 3, 4, SECOND RAY ARTHRODESIS, APPLICATION OF BELOW KNEE SPLINT, RIGHT FOOT per podiatry. Pmhx: none noted  Family / Caregiver Present: Yes (brother)  Diagnosis: ankle pain/fall  Follows Commands: Within Functional Limits  Subjective  Subjective: Pt found supine in bed and agreeable to PT. Pt reports she plans to d/c home today with her Mom.   Pain Screening  Patient Currently in Pain: Yes (rates R foot pain 4/10 with mobility, RN aware)         Orientation  Orientation  Overall Orientation Status: Within Functional Limits     Social/Functional History  Social/Functional History  Lives With:  (lives alone  but will d/c to [place]  with Mom)  Type of Home: House  Home Layout: Multi-level, Bed/Bath upstairs, 1/2 bath on main level  Home Access:  (2 to enter without rail)  Bathroom Shower/Tub: Walk-in shower  Bathroom Toilet: Standard (sink nearby)  [employer]: Grab bars in shower  Home Equipment: Roll About, Rolling walker, Crutches  ADL Assistance: Independent  Homemaking Assistance: Independent  Ambulation Assistance: Independent  Transfer Assistance: Independent  Active : Yes  Occupation: Full time employment  Type of occupation: dentist         Objective          AROM RLE (degrees)  RLE AROM: WFL (R ankle NT due to splint)  AROM LLE (degrees)  LLE AROM : WFL     Strength RLE  Strength RLE: WFL (R ankle NT due to splint)  Strength LLE  Strength LLE: WFL        Bed mobility  Supine to Sit: Independent  Scooting: Independent     Transfers  Sit to Stand: Stand by assistance (with vc for hand placement; from chair, from toilet, from bed, from rollabout ; pt able to maintain NWB R)  Stand to sit: Stand by assistance (to chair x2, to toilet. To rollabout; able to maintain NWB R)     Ambulation  Device: Rolling Walker  Assistance: Stand by assistance  Quality of Gait: slow, steady gait (hops) with pt able to maintain NWB R; pt fatigued quickly  Distance: 15 ft x2  Comments: Pt amb 15 ft with crutches and CGA progressing to SBA with vc for sequence initially; pt able to maintain NWB R. Pt fatigued quickly  Stairs/Curb  Stairs?:  (up/down 10 step with rail/crutch and CGA; min vc for safe sequence; pt able to maintain NWB R; pt fatigued quickly)    Rollabout: pt able  To maneuver rollabout on level surface 110 ft with occ vc for rollabout use/brake management/safety. No overt LOB noted.       Balance  Sitting - Static: Good  Sitting - Dynamic: Good  Standing - Static: Fair;+  Standing - Dynamic: Fair;+        Plan   Plan  Times per week: 2-5  Current Treatment Recommendations: Functional Mobility Training, Transfer Training, Endurance Training, Gait Training  Safety Devices  Type of devices: Nurse notified, Call light within reach, Chair alarm in place, Left in chair (pt reclined)             AM-PAC

## 2021-07-27 NOTE — PROGRESS NOTES
Making: Medium Complexity  OT Education: OT Role;Plan of Care  REQUIRES OT FOLLOW UP: No  Activity Tolerance  Activity Tolerance: Patient Tolerated treatment well  Activity Tolerance: pt reported mod fatigue after mobility in hallway, up/ down stairs  Safety Devices  Safety Devices in place: Yes  Type of devices: Left in chair;Nurse notified;Call light within reach; Chair alarm in place           Patient Diagnosis(es): The primary encounter diagnosis was Closed fracture of right foot, initial encounter. A diagnosis of Syncope and collapse was also pertinent to this visit. has no past medical history on file. has a past surgical history that includes Tonsillectomy and arthrodesis (Right, 7/26/2021). Treatment Diagnosis: decreased ADLs and transfers secondary to fall      Restrictions  Position Activity Restriction  Other position/activity restrictions: strict NWB R LE    Subjective   General  Chart Reviewed: Yes  Additional Pertinent Hx: Pt admitted to ED s/p syncope and fall at home getting out of the shower. Pt with R foot pain, XR: Transverse fractures of the third and fourth metatarsal bases. Surgery on 7/26 LISFRANC ARTHRODESIS, SECOND TMTJ ARTHRODESIS, ORIF METATARSALS 3, 4,  APPLICATION OF BELOW KNEE SPLINT, RIGHT FOOT. No PMHx on file. Family / Caregiver Present: No  Referring Practitioner: JESÚS Marquez  Diagnosis: R ankle pain  Subjective  Subjective: Pt semi supine in bed upon arrival, agreeable to OT eval and treat. Reported 8/10 pain at rest/ supine, reporting down to 4/10 pain when LE in dependent position/ walking with crutches.   Patient Currently in Pain: Yes (rates R foot pain 4/10 with mobility, RN aware)  Pain Assessment  Pain Level: 9  Vital Signs  Patient Currently in Pain: Yes (rates R foot pain 4/10 with mobility, RN aware)  Social/Functional History  Social/Functional History  Lives With:  (lives alone  but will d/c to [place]  with Mom)  Type of Home: House  Home Layout: Multi-level, Bed/Bath upstairs, 1/2 bath on main level  Home Access:  (2 to enter without rail)  Bathroom Shower/Tub: Walk-in shower  Bathroom Toilet: Standard (sink nearby)  Bathroom Equipment: Grab bars in shower  Home Equipment: Roll About, Rolling walker, Crutches  ADL Assistance: Independent  Homemaking Assistance: Independent  Ambulation Assistance: Independent  Transfer Assistance: Independent  Active : Yes  Occupation: Full time employment  Type of occupation: dentist       Objective        Orientation  Overall Orientation Status: Within Functional Limits     Balance  Sitting Balance: Independent  Standing Balance: Contact guard assistance (CGA during dynamic standing/ walking, SBA for static standing)  Standing Balance  Time: 20 mins total  Activity: mobility in room, in hallway  Functional Mobility  Functional - Mobility Device: Other (RW into bathroom, crutches short distance out of room, knee scooter in hallway)  Activity: To/from bathroom; Other (+ in hallway, up/down stairs)  Assist Level: Contact guard assistance  Functional Mobility Comments: progressing to SBA  Toilet Transfers  Toilet - Technique: Ambulating  Equipment Used: Standard toilet  Toilet Transfer: Stand by assistance  ADL  Feeding: Beverage management; Independent  Grooming: Stand by assistance (standing at sink for oral care, washing face seated in bedside chair)  UE Dressing: Setup (donning gown behind back)  LE Dressing: Stand by assistance (pt able to thread pants with R LE first, pulling up over hips standing at RW with SBA)  Toileting: Stand by assistance (to spv)           Transfers  Sit to stand: Stand by assistance  Stand to sit: Stand by assistance  Transfer Comments: min cueing to reach hand back when transferring from knee scooter back to recliner chair     Cognition  Overall Cognitive Status: WFL        Sensation  Overall Sensation Status: WFL        LUE AROM (degrees)  LUE AROM : WFL  Left Hand AROM (degrees)  Left Hand AROM: WFL  RUE AROM (degrees)  RUE AROM : WFL  Right Hand AROM (degrees)  Right Hand AROM: WFL  LUE Strength  Gross LUE Strength: WFL  RUE Strength  Gross RUE Strength: WFL        Treatment included functional transfer training, ADLs, and patient education. Plan   Plan  Times per week: eval and dc       Goals  Short term goals  Time Frame for Short term goals: eval and dc  Patient Goals   Patient goals : to go home       Therapy Time   Individual Concurrent Group Co-treatment   Time In 0805         Time Out 0900         Minutes 55         Timed Code Treatment Minutes: 40 Minutes (+15 min eval)    [place] CAN  [address] [facility], OTR/L [id number]

## 2021-07-28 NOTE — ANESTHESIA POSTPROCEDURE EVALUATION
Department of Anesthesiology  Postprocedure Note    Patient: Fanny Pearson  MRN: [id number]  YOB: 1995  Date of evaluation: 7/28/2021  Time:  6:55 PM     Procedure Summary     Date: 07/26/21 Room / Location: [address] [place] [address]    Anesthesia Start: 6896 Anesthesia Stop: 1935    Procedure: LISFRANC ARTHRODESIS, ORIF METATARSALS 3, 4, SECOND RAY ARTHRODESIS, APPLICATION OF BELOW KNEE SPLINT, RIGHT FOOT (Right ) Diagnosis: [facility] INJURY RIGHT FOOT)    Surgeons: Nicole Bill DPM Responsible Provider: Sindi Hoffman MD    Anesthesia Type: general ASA Status: 2          Anesthesia Type: general    Kina Phase I: Kina Score: 10    Kina Phase II:      Last vitals: Reviewed and per EMR flowsheets.        Anesthesia Post Evaluation    Patient location during evaluation: PACU  Patient participation: complete - patient participated  Level of consciousness: awake and alert  Pain score: 0  Airway patency: patent  Nausea & Vomiting: no nausea and no vomiting  Complications: no  Cardiovascular status: hemodynamically stable  Respiratory status: acceptable  Hydration status: euvolemic

## (undated) DEVICE — SYRINGE MED 10ML TRNSLUC BRL PLUNG BLK MRK POLYPR CTRL

## (undated) DEVICE — PLATE ES AD W 9FT CRD 2

## (undated) DEVICE — GARMENT,MEDLINE,DVT,INT,CALF,MED, GEN2: Brand: MEDLINE

## (undated) DEVICE — HOLDING PIN: Brand: ANCHORAGE

## (undated) DEVICE — NON-THREADED KWIRE: Brand: MINI

## (undated) DEVICE — COVER LT HNDL BLU PLAS

## (undated) DEVICE — BLADE OPHTH 180DEG CUT SURF BLU STR SHRP DBL BVL GRINDLESS

## (undated) DEVICE — IMPLANTABLE DEVICE
Type: IMPLANTABLE DEVICE | Site: FOOT | Status: NON-FUNCTIONAL
Removed: 2021-07-26

## (undated) DEVICE — BANDAGE COMPR M W4INXL10YD WHT BGE VELC E MTRX HK AND LOOP

## (undated) DEVICE — SUTURE VCRL SZ 3-0 L27IN ABSRB UD L26MM CT-2 1/2 CIR J232H

## (undated) DEVICE — THIN OFFSET (9.0 X 0.38 X 25.0MM)

## (undated) DEVICE — COVER,TABLE,HEAVY DUTY,77"X90",STRL: Brand: MEDLINE

## (undated) DEVICE — JOINT PREP INSTRUMENT KIT: Brand: ORTHOLOC™ 2

## (undated) DEVICE — GLOVE ORANGE PI 8   MSG9080

## (undated) DEVICE — INTENDED FOR TISSUE SEPARATION, AND OTHER PROCEDURES THAT REQUIRE A SHARP SURGICAL BLADE TO PUNCTURE OR CUT.: Brand: BARD-PARKER ® CARBON RIB-BACK BLADES

## (undated) DEVICE — SPONGE,LAP,18"X18",DLX,XR,ST,5/PK,40/PK: Brand: MEDLINE

## (undated) DEVICE — STANDARD HYPODERMIC NEEDLE,POLYPROPYLENE HUB: Brand: MONOJECT

## (undated) DEVICE — K-WIRE DRILL: Brand: VARIAX

## (undated) DEVICE — SPEEDGUIDE DRILL AO: Brand: VARIAX

## (undated) DEVICE — SPLNT ORTHO GLASS 4X15

## (undated) DEVICE — CANNULATED DRILL BIT: Brand: MINI

## (undated) DEVICE — JEWISH HOSPITAL TURNOVER KIT: Brand: MEDLINE INDUSTRIES, INC.

## (undated) DEVICE — UNTHREADED GUIDE WIRE: Brand: FIXOS

## (undated) DEVICE — SUTURE VCRL SZ 4-0 L27IN ABSRB UD L26MM SH 1/2 CIR J415H

## (undated) DEVICE — COUNTER NDL 40 COUNT HLD 70 NUM FOAM BLK SGL MAG W BLDE REMV

## (undated) DEVICE — MICRO SAGITTAL BLADE (9.4 X 0.4 X 26.2MM)

## (undated) DEVICE — ADHESIVE SKIN CLSR 0.7ML TOP DERMBND ADV

## (undated) DEVICE — DRILL BIT, AO, SCALED: Brand: VARIAX

## (undated) DEVICE — SOLUTION IV 1000ML 0.9% SOD CHL

## (undated) DEVICE — SUTURE MCRYL SZ 4-0 L27IN ABSRB UD L19MM PS-2 1/2 CIR PRIM Y426H

## (undated) DEVICE — GOWN,SIRUS,POLYRNF,BRTHSLV,XL,30/CS: Brand: MEDLINE

## (undated) DEVICE — E-Z CLEAN, NON-STICK, PTFE COATED, ELECTROSURGICAL BLADE ELECTRODE, 2.5 INCH (6.35 CM): Brand: EZ CLEAN

## (undated) DEVICE — K WIRE FIX L6IN DIA0.062IN 1600662] MICROAIRE SURGICAL INSTRUMENTS INC]
Type: IMPLANTABLE DEVICE | Site: FOOT | Status: NON-FUNCTIONAL
Removed: 2021-07-26

## (undated) DEVICE — REAMER FOR CROSS-PLATES: Brand: ANCHORAGE

## (undated) DEVICE — COVER,MAYO STAND,XL,STERILE: Brand: MEDLINE

## (undated) DEVICE — PADDING CAST W4INXL4YD HIGHLY ABSRB THAN COT EZ APPL

## (undated) DEVICE — DRILL, AO, SCALED: Brand: VARIAX

## (undated) DEVICE — BANDAGE COBAN 4 IN COMPR W4INXL5YD FOAM COHESIVE QUIK STK SELF ADH SFT

## (undated) DEVICE — 3M™ COBAN™ NL STERILE NON-LATEX SELF-ADHERENT WRAP, 2084S, 4 IN X 5 YD (10 CM X 4,5 M), 18 ROLLS/CASE: Brand: 3M™ COBAN™

## (undated) DEVICE — 3.0MM DEPTH GAUGE/ COUNTERSINK: Brand: MINI

## (undated) DEVICE — PODIATRY PK

## (undated) DEVICE — CHLORAPREP 26ML ORANGE